# Patient Record
Sex: MALE | Race: BLACK OR AFRICAN AMERICAN | NOT HISPANIC OR LATINO | URBAN - METROPOLITAN AREA
[De-identification: names, ages, dates, MRNs, and addresses within clinical notes are randomized per-mention and may not be internally consistent; named-entity substitution may affect disease eponyms.]

---

## 2018-12-31 ENCOUNTER — INPATIENT (INPATIENT)
Facility: HOSPITAL | Age: 33
LOS: 2 days | Discharge: HOME | DRG: 638 | End: 2019-01-03
Attending: INTERNAL MEDICINE | Admitting: INTERNAL MEDICINE
Payer: SELF-PAY

## 2018-12-31 VITALS
RESPIRATION RATE: 18 BRPM | SYSTOLIC BLOOD PRESSURE: 119 MMHG | HEART RATE: 84 BPM | WEIGHT: 216.93 LBS | DIASTOLIC BLOOD PRESSURE: 79 MMHG | TEMPERATURE: 98 F | OXYGEN SATURATION: 100 % | HEIGHT: 74 IN

## 2018-12-31 DIAGNOSIS — K21.9 GASTRO-ESOPHAGEAL REFLUX DISEASE WITHOUT ESOPHAGITIS: ICD-10-CM

## 2018-12-31 DIAGNOSIS — E13.10 OTHER SPECIFIED DIABETES MELLITUS WITH KETOACIDOSIS WITHOUT COMA: ICD-10-CM

## 2018-12-31 DIAGNOSIS — E87.1 HYPO-OSMOLALITY AND HYPONATREMIA: ICD-10-CM

## 2018-12-31 DIAGNOSIS — Z29.9 ENCOUNTER FOR PROPHYLACTIC MEASURES, UNSPECIFIED: ICD-10-CM

## 2018-12-31 DIAGNOSIS — J11.1 INFLUENZA DUE TO UNIDENTIFIED INFLUENZA VIRUS WITH OTHER RESPIRATORY MANIFESTATIONS: ICD-10-CM

## 2018-12-31 DIAGNOSIS — E78.5 HYPERLIPIDEMIA, UNSPECIFIED: ICD-10-CM

## 2018-12-31 LAB
ALBUMIN SERPL ELPH-MCNC: 4 G/DL — SIGNIFICANT CHANGE UP (ref 3.5–5)
ALP SERPL-CCNC: 105 U/L — SIGNIFICANT CHANGE UP (ref 40–120)
ALT FLD-CCNC: 44 U/L DA — SIGNIFICANT CHANGE UP (ref 10–60)
ANION GAP SERPL CALC-SCNC: 13 MMOL/L — SIGNIFICANT CHANGE UP (ref 5–17)
ANION GAP SERPL CALC-SCNC: 16 MMOL/L — SIGNIFICANT CHANGE UP (ref 5–17)
ANION GAP SERPL CALC-SCNC: 19 MMOL/L — HIGH (ref 5–17)
ANION GAP SERPL CALC-SCNC: 20 MMOL/L — HIGH (ref 5–17)
APPEARANCE UR: CLEAR — SIGNIFICANT CHANGE UP
AST SERPL-CCNC: 39 U/L — SIGNIFICANT CHANGE UP (ref 10–40)
B-OH-BUTYR SERPL-SCNC: 6.1 MMOL/L — HIGH
BASE EXCESS BLDA CALC-SCNC: -12.2 MMOL/L — LOW (ref -2–2)
BASOPHILS # BLD AUTO: 0.1 K/UL — SIGNIFICANT CHANGE UP (ref 0–0.2)
BASOPHILS NFR BLD AUTO: 0.6 % — SIGNIFICANT CHANGE UP (ref 0–2)
BILIRUB SERPL-MCNC: 0.8 MG/DL — SIGNIFICANT CHANGE UP (ref 0.2–1.2)
BILIRUB UR-MCNC: NEGATIVE — SIGNIFICANT CHANGE UP
BLOOD GAS COMMENTS ARTERIAL: SIGNIFICANT CHANGE UP
BUN SERPL-MCNC: 11 MG/DL — SIGNIFICANT CHANGE UP (ref 7–18)
BUN SERPL-MCNC: 13 MG/DL — SIGNIFICANT CHANGE UP (ref 7–18)
BUN SERPL-MCNC: 15 MG/DL — SIGNIFICANT CHANGE UP (ref 7–18)
BUN SERPL-MCNC: 9 MG/DL — SIGNIFICANT CHANGE UP (ref 7–18)
CALCIUM SERPL-MCNC: 8.2 MG/DL — LOW (ref 8.4–10.5)
CALCIUM SERPL-MCNC: 8.3 MG/DL — LOW (ref 8.4–10.5)
CALCIUM SERPL-MCNC: 8.5 MG/DL — SIGNIFICANT CHANGE UP (ref 8.4–10.5)
CALCIUM SERPL-MCNC: 9.2 MG/DL — SIGNIFICANT CHANGE UP (ref 8.4–10.5)
CHLORIDE SERPL-SCNC: 82 MMOL/L — LOW (ref 96–108)
CHLORIDE SERPL-SCNC: 89 MMOL/L — LOW (ref 96–108)
CHLORIDE SERPL-SCNC: 92 MMOL/L — LOW (ref 96–108)
CHLORIDE SERPL-SCNC: 93 MMOL/L — LOW (ref 96–108)
CHLORIDE UR-SCNC: 31 MMOL/L — LOW (ref 55–125)
CO2 SERPL-SCNC: 16 MMOL/L — LOW (ref 22–31)
CO2 SERPL-SCNC: 17 MMOL/L — LOW (ref 22–31)
CO2 SERPL-SCNC: 18 MMOL/L — LOW (ref 22–31)
CO2 SERPL-SCNC: 22 MMOL/L — SIGNIFICANT CHANGE UP (ref 22–31)
COLOR SPEC: YELLOW — SIGNIFICANT CHANGE UP
CREAT SERPL-MCNC: 0.84 MG/DL — SIGNIFICANT CHANGE UP (ref 0.5–1.3)
CREAT SERPL-MCNC: 0.88 MG/DL — SIGNIFICANT CHANGE UP (ref 0.5–1.3)
CREAT SERPL-MCNC: 0.97 MG/DL — SIGNIFICANT CHANGE UP (ref 0.5–1.3)
CREAT SERPL-MCNC: 1.03 MG/DL — SIGNIFICANT CHANGE UP (ref 0.5–1.3)
DIFF PNL FLD: NEGATIVE — SIGNIFICANT CHANGE UP
EOSINOPHIL # BLD AUTO: 0 K/UL — SIGNIFICANT CHANGE UP (ref 0–0.5)
EOSINOPHIL NFR BLD AUTO: 0 % — SIGNIFICANT CHANGE UP (ref 0–6)
GLUCOSE SERPL-MCNC: 177 MG/DL — HIGH (ref 70–99)
GLUCOSE SERPL-MCNC: 240 MG/DL — HIGH (ref 70–99)
GLUCOSE SERPL-MCNC: 265 MG/DL — HIGH (ref 70–99)
GLUCOSE SERPL-MCNC: 352 MG/DL — HIGH (ref 70–99)
GLUCOSE UR QL: 1000 MG/DL
HBA1C BLD-MCNC: 11.8 % — HIGH (ref 4–5.6)
HCO3 BLDA-SCNC: 12 MMOL/L — LOW (ref 23–27)
HCT VFR BLD CALC: 51.8 % — HIGH (ref 39–50)
HGB BLD-MCNC: 16.4 G/DL — SIGNIFICANT CHANGE UP (ref 13–17)
HOROWITZ INDEX BLDA+IHG-RTO: 21 — SIGNIFICANT CHANGE UP
KETONES UR-MCNC: ABNORMAL
LEUKOCYTE ESTERASE UR-ACNC: NEGATIVE — SIGNIFICANT CHANGE UP
LYMPHOCYTES # BLD AUTO: 0.5 K/UL — LOW (ref 1–3.3)
LYMPHOCYTES # BLD AUTO: 6.7 % — LOW (ref 13–44)
MAGNESIUM SERPL-MCNC: 2 MG/DL — SIGNIFICANT CHANGE UP (ref 1.6–2.6)
MCHC RBC-ENTMCNC: 27.9 PG — SIGNIFICANT CHANGE UP (ref 27–34)
MCHC RBC-ENTMCNC: 31.5 GM/DL — LOW (ref 32–36)
MCV RBC AUTO: 88.6 FL — SIGNIFICANT CHANGE UP (ref 80–100)
MONOCYTES # BLD AUTO: 0.9 K/UL — SIGNIFICANT CHANGE UP (ref 0–0.9)
MONOCYTES NFR BLD AUTO: 11.4 % — SIGNIFICANT CHANGE UP (ref 2–14)
NEUTROPHILS # BLD AUTO: 6.3 K/UL — SIGNIFICANT CHANGE UP (ref 1.8–7.4)
NEUTROPHILS NFR BLD AUTO: 81.1 % — HIGH (ref 43–77)
NITRITE UR-MCNC: NEGATIVE — SIGNIFICANT CHANGE UP
OSMOLALITY SERPL: 282 MOS/KG — SIGNIFICANT CHANGE UP (ref 275–300)
PCO2 BLDA: 23 MMHG — LOW (ref 32–46)
PCP SPEC-MCNC: SIGNIFICANT CHANGE UP
PH BLDA: 7.33 — LOW (ref 7.35–7.45)
PH UR: 5 — SIGNIFICANT CHANGE UP (ref 5–8)
PHOSPHATE SERPL-MCNC: 2.3 MG/DL — LOW (ref 2.5–4.5)
PLATELET # BLD AUTO: 341 K/UL — SIGNIFICANT CHANGE UP (ref 150–400)
PO2 BLDA: 116 MMHG — HIGH (ref 74–108)
POTASSIUM SERPL-MCNC: 3.9 MMOL/L — SIGNIFICANT CHANGE UP (ref 3.5–5.3)
POTASSIUM SERPL-MCNC: 4.1 MMOL/L — SIGNIFICANT CHANGE UP (ref 3.5–5.3)
POTASSIUM SERPL-MCNC: 4.4 MMOL/L — SIGNIFICANT CHANGE UP (ref 3.5–5.3)
POTASSIUM SERPL-MCNC: 4.9 MMOL/L — SIGNIFICANT CHANGE UP (ref 3.5–5.3)
POTASSIUM SERPL-SCNC: 3.9 MMOL/L — SIGNIFICANT CHANGE UP (ref 3.5–5.3)
POTASSIUM SERPL-SCNC: 4.1 MMOL/L — SIGNIFICANT CHANGE UP (ref 3.5–5.3)
POTASSIUM SERPL-SCNC: 4.4 MMOL/L — SIGNIFICANT CHANGE UP (ref 3.5–5.3)
POTASSIUM SERPL-SCNC: 4.9 MMOL/L — SIGNIFICANT CHANGE UP (ref 3.5–5.3)
POTASSIUM UR-SCNC: 18 MMOL/L — LOW (ref 25–125)
PROT ?TM UR-MCNC: 34 MG/DL — HIGH (ref 0–12)
PROT SERPL-MCNC: 9 G/DL — HIGH (ref 6–8.3)
PROT UR-MCNC: 15
RBC # BLD: 5.86 M/UL — HIGH (ref 4.2–5.8)
RBC # FLD: 11.7 % — SIGNIFICANT CHANGE UP (ref 10.3–14.5)
SAO2 % BLDA: 98 % — HIGH (ref 92–96)
SODIUM SERPL-SCNC: 120 MMOL/L — CRITICAL LOW (ref 135–145)
SODIUM SERPL-SCNC: 124 MMOL/L — LOW (ref 135–145)
SODIUM SERPL-SCNC: 126 MMOL/L — LOW (ref 135–145)
SODIUM SERPL-SCNC: 127 MMOL/L — LOW (ref 135–145)
SODIUM UR-SCNC: 59 MMOL/L — SIGNIFICANT CHANGE UP (ref 40–220)
SP GR SPEC: 1.02 — SIGNIFICANT CHANGE UP (ref 1.01–1.02)
URATE UR-MCNC: 8.4 MG/DL — SIGNIFICANT CHANGE UP
UROBILINOGEN FLD QL: NEGATIVE — SIGNIFICANT CHANGE UP
WBC # BLD: 7.8 K/UL — SIGNIFICANT CHANGE UP (ref 3.8–10.5)
WBC # FLD AUTO: 7.8 K/UL — SIGNIFICANT CHANGE UP (ref 3.8–10.5)

## 2018-12-31 PROCEDURE — 71045 X-RAY EXAM CHEST 1 VIEW: CPT | Mod: 26

## 2018-12-31 PROCEDURE — 99284 EMERGENCY DEPT VISIT MOD MDM: CPT

## 2018-12-31 RX ORDER — CHLORHEXIDINE GLUCONATE 213 G/1000ML
1 SOLUTION TOPICAL
Qty: 0 | Refills: 0 | Status: DISCONTINUED | OUTPATIENT
Start: 2018-12-31 | End: 2019-01-03

## 2018-12-31 RX ORDER — SODIUM CHLORIDE 9 MG/ML
1000 INJECTION, SOLUTION INTRAVENOUS
Qty: 0 | Refills: 0 | Status: DISCONTINUED | OUTPATIENT
Start: 2018-12-31 | End: 2018-12-31

## 2018-12-31 RX ORDER — DEXTROSE 50 % IN WATER 50 %
15 SYRINGE (ML) INTRAVENOUS ONCE
Qty: 0 | Refills: 0 | Status: DISCONTINUED | OUTPATIENT
Start: 2018-12-31 | End: 2018-12-31

## 2018-12-31 RX ORDER — ACETAMINOPHEN 500 MG
650 TABLET ORAL EVERY 6 HOURS
Qty: 0 | Refills: 0 | Status: DISCONTINUED | OUTPATIENT
Start: 2018-12-31 | End: 2019-01-03

## 2018-12-31 RX ORDER — INSULIN LISPRO 100/ML
VIAL (ML) SUBCUTANEOUS EVERY 6 HOURS
Qty: 0 | Refills: 0 | Status: DISCONTINUED | OUTPATIENT
Start: 2018-12-31 | End: 2018-12-31

## 2018-12-31 RX ORDER — DEXTROSE 50 % IN WATER 50 %
25 SYRINGE (ML) INTRAVENOUS ONCE
Qty: 0 | Refills: 0 | Status: DISCONTINUED | OUTPATIENT
Start: 2018-12-31 | End: 2018-12-31

## 2018-12-31 RX ORDER — SODIUM CHLORIDE 9 MG/ML
1000 INJECTION INTRAMUSCULAR; INTRAVENOUS; SUBCUTANEOUS ONCE
Qty: 0 | Refills: 0 | Status: COMPLETED | OUTPATIENT
Start: 2018-12-31 | End: 2018-12-31

## 2018-12-31 RX ORDER — INSULIN LISPRO 100/ML
VIAL (ML) SUBCUTANEOUS
Qty: 0 | Refills: 0 | Status: DISCONTINUED | OUTPATIENT
Start: 2018-12-31 | End: 2018-12-31

## 2018-12-31 RX ORDER — INSULIN HUMAN 100 [IU]/ML
10 INJECTION, SOLUTION SUBCUTANEOUS ONCE
Qty: 0 | Refills: 0 | Status: COMPLETED | OUTPATIENT
Start: 2018-12-31 | End: 2018-12-31

## 2018-12-31 RX ORDER — SODIUM,POTASSIUM PHOSPHATES 278-250MG
1 POWDER IN PACKET (EA) ORAL EVERY 6 HOURS
Qty: 0 | Refills: 0 | Status: COMPLETED | OUTPATIENT
Start: 2018-12-31 | End: 2019-01-01

## 2018-12-31 RX ORDER — BENZOCAINE AND MENTHOL 5; 1 G/100ML; G/100ML
1 LIQUID ORAL EVERY 4 HOURS
Qty: 0 | Refills: 0 | Status: DISCONTINUED | OUTPATIENT
Start: 2018-12-31 | End: 2019-01-03

## 2018-12-31 RX ORDER — INSULIN LISPRO 100/ML
VIAL (ML) SUBCUTANEOUS
Qty: 0 | Refills: 0 | Status: DISCONTINUED | OUTPATIENT
Start: 2018-12-31 | End: 2019-01-03

## 2018-12-31 RX ORDER — PANTOPRAZOLE SODIUM 20 MG/1
40 TABLET, DELAYED RELEASE ORAL
Qty: 0 | Refills: 0 | Status: DISCONTINUED | OUTPATIENT
Start: 2018-12-31 | End: 2019-01-01

## 2018-12-31 RX ORDER — INSULIN LISPRO 100/ML
4 VIAL (ML) SUBCUTANEOUS
Qty: 0 | Refills: 0 | Status: DISCONTINUED | OUTPATIENT
Start: 2018-12-31 | End: 2019-01-02

## 2018-12-31 RX ORDER — INSULIN HUMAN 100 [IU]/ML
1 INJECTION, SOLUTION SUBCUTANEOUS
Qty: 100 | Refills: 0 | Status: DISCONTINUED | OUTPATIENT
Start: 2018-12-31 | End: 2019-01-01

## 2018-12-31 RX ORDER — GLUCAGON INJECTION, SOLUTION 0.5 MG/.1ML
1 INJECTION, SOLUTION SUBCUTANEOUS ONCE
Qty: 0 | Refills: 0 | Status: DISCONTINUED | OUTPATIENT
Start: 2018-12-31 | End: 2018-12-31

## 2018-12-31 RX ORDER — INSULIN GLARGINE 100 [IU]/ML
20 INJECTION, SOLUTION SUBCUTANEOUS AT BEDTIME
Qty: 0 | Refills: 0 | Status: DISCONTINUED | OUTPATIENT
Start: 2018-12-31 | End: 2019-01-02

## 2018-12-31 RX ORDER — ENOXAPARIN SODIUM 100 MG/ML
40 INJECTION SUBCUTANEOUS DAILY
Qty: 0 | Refills: 0 | Status: DISCONTINUED | OUTPATIENT
Start: 2018-12-31 | End: 2019-01-03

## 2018-12-31 RX ORDER — FAMOTIDINE 10 MG/ML
20 INJECTION INTRAVENOUS ONCE
Qty: 0 | Refills: 0 | Status: COMPLETED | OUTPATIENT
Start: 2018-12-31 | End: 2018-12-31

## 2018-12-31 RX ORDER — DEXTROSE MONOHYDRATE, SODIUM CHLORIDE, AND POTASSIUM CHLORIDE 50; .745; 4.5 G/1000ML; G/1000ML; G/1000ML
1000 INJECTION, SOLUTION INTRAVENOUS
Qty: 0 | Refills: 0 | Status: DISCONTINUED | OUTPATIENT
Start: 2018-12-31 | End: 2019-01-01

## 2018-12-31 RX ORDER — FAMOTIDINE 10 MG/ML
20 INJECTION INTRAVENOUS ONCE
Qty: 0 | Refills: 0 | Status: DISCONTINUED | OUTPATIENT
Start: 2018-12-31 | End: 2018-12-31

## 2018-12-31 RX ORDER — IBUPROFEN 200 MG
400 TABLET ORAL ONCE
Qty: 0 | Refills: 0 | Status: COMPLETED | OUTPATIENT
Start: 2018-12-31 | End: 2018-12-31

## 2018-12-31 RX ORDER — DEXTROSE 50 % IN WATER 50 %
12.5 SYRINGE (ML) INTRAVENOUS ONCE
Qty: 0 | Refills: 0 | Status: DISCONTINUED | OUTPATIENT
Start: 2018-12-31 | End: 2018-12-31

## 2018-12-31 RX ADMIN — Medication 400 MILLIGRAM(S): at 21:03

## 2018-12-31 RX ADMIN — CHLORHEXIDINE GLUCONATE 1 APPLICATION(S): 213 SOLUTION TOPICAL at 17:35

## 2018-12-31 RX ADMIN — Medication 30 MILLILITER(S): at 07:02

## 2018-12-31 RX ADMIN — ENOXAPARIN SODIUM 40 MILLIGRAM(S): 100 INJECTION SUBCUTANEOUS at 17:35

## 2018-12-31 RX ADMIN — INSULIN GLARGINE 20 UNIT(S): 100 INJECTION, SOLUTION SUBCUTANEOUS at 23:23

## 2018-12-31 RX ADMIN — Medication 1 TABLET(S): at 21:03

## 2018-12-31 RX ADMIN — Medication 400 MILLIGRAM(S): at 21:55

## 2018-12-31 RX ADMIN — Medication 650 MILLIGRAM(S): at 18:48

## 2018-12-31 RX ADMIN — FAMOTIDINE 20 MILLIGRAM(S): 10 INJECTION INTRAVENOUS at 07:02

## 2018-12-31 RX ADMIN — SODIUM CHLORIDE 100 MILLILITER(S): 9 INJECTION, SOLUTION INTRAVENOUS at 15:04

## 2018-12-31 RX ADMIN — INSULIN HUMAN 1 UNIT(S)/HR: 100 INJECTION, SOLUTION SUBCUTANEOUS at 17:36

## 2018-12-31 RX ADMIN — Medication 75 MILLIGRAM(S): at 17:39

## 2018-12-31 RX ADMIN — Medication 650 MILLIGRAM(S): at 15:47

## 2018-12-31 RX ADMIN — SODIUM CHLORIDE 1000 MILLILITER(S): 9 INJECTION INTRAMUSCULAR; INTRAVENOUS; SUBCUTANEOUS at 06:58

## 2018-12-31 RX ADMIN — INSULIN HUMAN 10 UNIT(S): 100 INJECTION, SOLUTION SUBCUTANEOUS at 09:28

## 2018-12-31 RX ADMIN — BENZOCAINE AND MENTHOL 1 LOZENGE: 5; 1 LIQUID ORAL at 18:49

## 2018-12-31 RX ADMIN — SODIUM CHLORIDE 1000 MILLILITER(S): 9 INJECTION INTRAMUSCULAR; INTRAVENOUS; SUBCUTANEOUS at 09:16

## 2018-12-31 NOTE — ED ADULT NURSE NOTE - OBJECTIVE STATEMENT
pt stated he was seen at an urgent care center 2 days ago  and was diagnosis with the flu, had a sore throat then and now the sore throat is getting worse and he has acid reflux. Stated he is currently taking Tamiflu and he feels weak.

## 2018-12-31 NOTE — H&P ADULT - ASSESSMENT
34 y/o M from home with PMH of DM (not on any meds currently, was on Insulin and metformin  in past), GERD came with c/o sore throat, productive cough, generalized weakness and malaise for 4 days. Labs significant for hyponatremia of 120. Serum glucose 352. AG of 20. ABG on room air: 7.33/23/116. Admitted to ICU for hyponatremia and DKA.

## 2018-12-31 NOTE — H&P ADULT - PROBLEM SELECTOR PLAN 6
IMPROVE VTE Individual Risk Assessment          RISK                                                          Points  [  ] Previous VTE                                                3  [  ] Thrombophilia                                             2  [  ] Lower limb paralysis                                   2        (unable to hold up >15 seconds)    [  ] Current Cancer                                             2         (within 6 months)  [ x ] Immobilization > 24 hrs                              1  [ x ] ICU/CCU stay > 24 hours                             1  [  ] Age > 60                                                         1    IMPROVE VTE Score: 2  Lovenox for DVT ppx  Protonix for GI ppx.

## 2018-12-31 NOTE — ED PROVIDER NOTE - PROGRESS NOTE DETAILS
labs reveal borderline DKA. pending serum ketones, ICU consult, admit. patient reports having Diabetes Mellitus in past then was able to stop medication ( metformin) with diet control etc. glucose has been pretty good.

## 2018-12-31 NOTE — ED PROVIDER NOTE - OBJECTIVE STATEMENT
patient with 4 days of sore throat body aches malaise, threw up once. also notes exacerbation of his gerd. son was sick 2 weeks

## 2018-12-31 NOTE — H&P ADULT - PROBLEM SELECTOR PLAN 5
IMPROVE VTE Individual Risk Assessment          RISK                                                          Points  [  ] Previous VTE                                                3  [  ] Thrombophilia                                             2  [  ] Lower limb paralysis                                   2        (unable to hold up >15 seconds)    [  ] Current Cancer                                             2         (within 6 months)  [ x ] Immobilization > 24 hrs                              1  [ x ] ICU/CCU stay > 24 hours                             1  [  ] Age > 60                                                         1    IMPROVE VTE Score: 2  Lovenox for DVT ppx  Protonix for GI ppx. Will initiate statin therapy once tolerating oral diet.

## 2018-12-31 NOTE — H&P ADULT - PROBLEM SELECTOR PLAN 2
- Serum glucose 352 with AG of 20 and small serum acetone, ketonuria and glycosuria on admission  - pH of 7.33 on ABG on room air.   - Will start on insulin drip  - accu cheks q1h  - Monitor BMP and VBG q6   - F/u serum beta hydroxy butyrate.   - f/u HbA1c

## 2018-12-31 NOTE — PATIENT PROFILE ADULT - BRADEN SCORE
Alert-The patient is alert, awake and responds to voice. The patient is oriented to time, place, and person. The triage nurse is able to obtain subjective information. 21

## 2018-12-31 NOTE — H&P ADULT - HISTORY OF PRESENT ILLNESS
34 y/o M from home with PMH of DM (not on any meds currently, was on Insulin and metformin  in past), GERD came with c/o sore throat, productive cough, generalized weakness and malaise for 4 days. Reported kids were sick at home last week, went to urgent care 2 days ago and was given Tamiflu which he took for 2 days. Patient states that he is not able to eat for past 2 days , vomited x 1 yesterday and have been drinking a lot of water to keep himself hydrated. Denies any  chest pain, dyspnea, abdominal pain, nausea, vomiting, urinary complaints, dizziness, headache or any other complaints. Uses cannabis frequently. Denies tobacco or alcohol use. 32 y/o M from home with PMH of DM (not on any meds currently, was on Insulin and metformin  in past), GERD came with c/o sore throat, productive cough, generalized weakness and malaise for 4 days. Reported kids were sick at home last week, went to urgent care 2 days ago and was given Tamiflu which he took for 2 days. Patient states that he is not able to eat for past 2 days , vomited x 1 yesterday and have been drinking a lot of water to keep himself hydrated. Denies any  chest pain, dyspnea, abdominal pain, nausea, vomiting, urinary complaints, dizziness, headache or any other complaints. Uses cannabis frequently. Denies tobacco or alcohol use.      ED course:  Vitals stable. Labs significant for hyponatremia of 120. Serum glucose 352. AG of 20. Small serum acetone.  ABG on room air: 7.33/23/116. Received 2 L NS bolus. ICU consulted for hyponatremia and concern for DKA.

## 2018-12-31 NOTE — H&P ADULT - PROBLEM SELECTOR PLAN 1
- Likely secondary to hypovolemic hyponatremia or hypertonic hyponatremia secondary to hyperglycemia.   - Na on admission 120, corrected sodium for serum glucose of 352 would be 124.  - s/p 2 L NS bolus, repeat serum sodium is 124 and corrected 127.   - Will start on 1/2 NS at 75 cc/hr.  - Check urine osmolality and urine lytes.   - Check serum cortisol, TSH and Lipid panel  - Monitor BMP q4-q6h  - Goal rate of sodium correction in first 24 hrs is 8 mEq/L. - Likely secondary to hypovolemic hyponatremia or hypertonic hyponatremia secondary to hyperglycemia.   - Na on admission 120, corrected sodium for serum glucose of 352 would be 124.  - s/p 2 L NS bolus, repeat serum sodium is 124 and corrected 127.   - Will start on 1/2 NS at 100 cc/hr.  - Check urine osmolality and urine lytes.   - Check serum cortisol, TSH and Lipid panel  - Monitor BMP q4-q6h  - Goal rate of sodium correction in first 24 hrs is 8 mEq/L.

## 2018-12-31 NOTE — H&P ADULT - PROBLEM SELECTOR PLAN 3
- patient has sore throat for 4 days, was found to have flu at urgent care 2 days back and took 2 days of Tamiflu.   - Continue Tamiflu for 3 more days  - Cepacol for sore throat  - supportive care.

## 2018-12-31 NOTE — H&P ADULT - ATTENDING COMMENTS
Patient seen and examined with resident, Addendum to above.    34 y/o male with history of DM but not on medications, recently diagnosed with flu as outpatient and on Tamiflu. Admitted to the ICU for DKA requiring insulin infusion. Will give IV hydration and insulin infusion. monitor serial BMPs. Also found to have hyponatremia with a corrected sodium of 124 on admission. Likely hypovolemic hyponatremia given patient with hypochloremia and stating he hasn't be tolerating oral diet due to being sick. Will aim to correct sodium level about 8 Meq over the next 24 hours. Will place patient on droplet precautions, given recent diagnosis of flu and being treated as outpatient.

## 2019-01-01 LAB
ALBUMIN SERPL ELPH-MCNC: 2.8 G/DL — LOW (ref 3.5–5)
ALP SERPL-CCNC: 79 U/L — SIGNIFICANT CHANGE UP (ref 40–120)
ALT FLD-CCNC: 39 U/L DA — SIGNIFICANT CHANGE UP (ref 10–60)
ANION GAP SERPL CALC-SCNC: 14 MMOL/L — SIGNIFICANT CHANGE UP (ref 5–17)
ANION GAP SERPL CALC-SCNC: 14 MMOL/L — SIGNIFICANT CHANGE UP (ref 5–17)
ANION GAP SERPL CALC-SCNC: 15 MMOL/L — SIGNIFICANT CHANGE UP (ref 5–17)
AST SERPL-CCNC: 50 U/L — HIGH (ref 10–40)
BASOPHILS # BLD AUTO: 0.2 K/UL — SIGNIFICANT CHANGE UP (ref 0–0.2)
BASOPHILS NFR BLD AUTO: 2 % — SIGNIFICANT CHANGE UP (ref 0–2)
BILIRUB SERPL-MCNC: 0.7 MG/DL — SIGNIFICANT CHANGE UP (ref 0.2–1.2)
BUN SERPL-MCNC: 11 MG/DL — SIGNIFICANT CHANGE UP (ref 7–18)
BUN SERPL-MCNC: 5 MG/DL — LOW (ref 7–18)
BUN SERPL-MCNC: 7 MG/DL — SIGNIFICANT CHANGE UP (ref 7–18)
CALCIUM SERPL-MCNC: 7.6 MG/DL — LOW (ref 8.4–10.5)
CALCIUM SERPL-MCNC: 8 MG/DL — LOW (ref 8.4–10.5)
CALCIUM SERPL-MCNC: 8.2 MG/DL — LOW (ref 8.4–10.5)
CHLORIDE SERPL-SCNC: 93 MMOL/L — LOW (ref 96–108)
CHLORIDE SERPL-SCNC: 95 MMOL/L — LOW (ref 96–108)
CHLORIDE SERPL-SCNC: 96 MMOL/L — SIGNIFICANT CHANGE UP (ref 96–108)
CK MB BLD-MCNC: 0.7 % — SIGNIFICANT CHANGE UP (ref 0–3.5)
CK MB CFR SERPL CALC: 1.5 NG/ML — SIGNIFICANT CHANGE UP (ref 0–3.6)
CK SERPL-CCNC: 210 U/L — SIGNIFICANT CHANGE UP (ref 35–232)
CO2 SERPL-SCNC: 17 MMOL/L — LOW (ref 22–31)
CO2 SERPL-SCNC: 21 MMOL/L — LOW (ref 22–31)
CO2 SERPL-SCNC: 22 MMOL/L — SIGNIFICANT CHANGE UP (ref 22–31)
CREAT SERPL-MCNC: 0.65 MG/DL — SIGNIFICANT CHANGE UP (ref 0.5–1.3)
CREAT SERPL-MCNC: 0.88 MG/DL — SIGNIFICANT CHANGE UP (ref 0.5–1.3)
CREAT SERPL-MCNC: 0.97 MG/DL — SIGNIFICANT CHANGE UP (ref 0.5–1.3)
CULTURE RESULTS: SIGNIFICANT CHANGE UP
EOSINOPHIL # BLD AUTO: 0 K/UL — SIGNIFICANT CHANGE UP (ref 0–0.5)
EOSINOPHIL NFR BLD AUTO: 0 % — SIGNIFICANT CHANGE UP (ref 0–6)
GLUCOSE SERPL-MCNC: 183 MG/DL — HIGH (ref 70–99)
GLUCOSE SERPL-MCNC: 198 MG/DL — HIGH (ref 70–99)
GLUCOSE SERPL-MCNC: 226 MG/DL — HIGH (ref 70–99)
HCT VFR BLD CALC: 45.7 % — SIGNIFICANT CHANGE UP (ref 39–50)
HGB BLD-MCNC: 14.6 G/DL — SIGNIFICANT CHANGE UP (ref 13–17)
LYMPHOCYTES # BLD AUTO: 2 K/UL — SIGNIFICANT CHANGE UP (ref 1–3.3)
LYMPHOCYTES # BLD AUTO: 22.4 % — SIGNIFICANT CHANGE UP (ref 13–44)
MAGNESIUM SERPL-MCNC: 1.9 MG/DL — SIGNIFICANT CHANGE UP (ref 1.6–2.6)
MCHC RBC-ENTMCNC: 27.9 PG — SIGNIFICANT CHANGE UP (ref 27–34)
MCHC RBC-ENTMCNC: 31.8 GM/DL — LOW (ref 32–36)
MCV RBC AUTO: 87.5 FL — SIGNIFICANT CHANGE UP (ref 80–100)
MONOCYTES # BLD AUTO: 1.5 K/UL — HIGH (ref 0–0.9)
MONOCYTES NFR BLD AUTO: 17 % — HIGH (ref 2–14)
NEUTROPHILS # BLD AUTO: 5.3 K/UL — SIGNIFICANT CHANGE UP (ref 1.8–7.4)
NEUTROPHILS NFR BLD AUTO: 58.6 % — SIGNIFICANT CHANGE UP (ref 43–77)
PHOSPHATE SERPL-MCNC: 2.7 MG/DL — SIGNIFICANT CHANGE UP (ref 2.5–4.5)
PLATELET # BLD AUTO: 306 K/UL — SIGNIFICANT CHANGE UP (ref 150–400)
POTASSIUM SERPL-MCNC: 3.4 MMOL/L — LOW (ref 3.5–5.3)
POTASSIUM SERPL-MCNC: 3.8 MMOL/L — SIGNIFICANT CHANGE UP (ref 3.5–5.3)
POTASSIUM SERPL-MCNC: 4.3 MMOL/L — SIGNIFICANT CHANGE UP (ref 3.5–5.3)
POTASSIUM SERPL-SCNC: 3.4 MMOL/L — LOW (ref 3.5–5.3)
POTASSIUM SERPL-SCNC: 3.8 MMOL/L — SIGNIFICANT CHANGE UP (ref 3.5–5.3)
POTASSIUM SERPL-SCNC: 4.3 MMOL/L — SIGNIFICANT CHANGE UP (ref 3.5–5.3)
PROT SERPL-MCNC: 7.2 G/DL — SIGNIFICANT CHANGE UP (ref 6–8.3)
RBC # BLD: 5.23 M/UL — SIGNIFICANT CHANGE UP (ref 4.2–5.8)
RBC # FLD: 11.7 % — SIGNIFICANT CHANGE UP (ref 10.3–14.5)
SODIUM SERPL-SCNC: 127 MMOL/L — LOW (ref 135–145)
SODIUM SERPL-SCNC: 130 MMOL/L — LOW (ref 135–145)
SODIUM SERPL-SCNC: 130 MMOL/L — LOW (ref 135–145)
SPECIMEN SOURCE: SIGNIFICANT CHANGE UP
TROPONIN I SERPL-MCNC: <0.015 NG/ML — SIGNIFICANT CHANGE UP (ref 0–0.04)
WBC # BLD: 9 K/UL — SIGNIFICANT CHANGE UP (ref 3.8–10.5)
WBC # FLD AUTO: 9 K/UL — SIGNIFICANT CHANGE UP (ref 3.8–10.5)

## 2019-01-01 RX ORDER — SODIUM CHLORIDE 9 MG/ML
1000 INJECTION INTRAMUSCULAR; INTRAVENOUS; SUBCUTANEOUS
Qty: 0 | Refills: 0 | Status: DISCONTINUED | OUTPATIENT
Start: 2019-01-01 | End: 2019-01-01

## 2019-01-01 RX ORDER — SODIUM CHLORIDE 9 MG/ML
1000 INJECTION INTRAMUSCULAR; INTRAVENOUS; SUBCUTANEOUS ONCE
Qty: 0 | Refills: 0 | Status: COMPLETED | OUTPATIENT
Start: 2019-01-01 | End: 2019-01-01

## 2019-01-01 RX ORDER — ATORVASTATIN CALCIUM 80 MG/1
40 TABLET, FILM COATED ORAL AT BEDTIME
Qty: 0 | Refills: 0 | Status: DISCONTINUED | OUTPATIENT
Start: 2019-01-01 | End: 2019-01-03

## 2019-01-01 RX ORDER — INSULIN LISPRO 100/ML
4 VIAL (ML) SUBCUTANEOUS ONCE
Qty: 0 | Refills: 0 | Status: COMPLETED | OUTPATIENT
Start: 2019-01-01 | End: 2019-01-01

## 2019-01-01 RX ORDER — POTASSIUM CHLORIDE 20 MEQ
20 PACKET (EA) ORAL
Qty: 0 | Refills: 0 | Status: COMPLETED | OUTPATIENT
Start: 2019-01-01 | End: 2019-01-01

## 2019-01-01 RX ORDER — PANTOPRAZOLE SODIUM 20 MG/1
40 TABLET, DELAYED RELEASE ORAL
Qty: 0 | Refills: 0 | Status: DISCONTINUED | OUTPATIENT
Start: 2019-01-01 | End: 2019-01-03

## 2019-01-01 RX ADMIN — Medication 650 MILLIGRAM(S): at 16:42

## 2019-01-01 RX ADMIN — Medication 75 MILLIGRAM(S): at 17:00

## 2019-01-01 RX ADMIN — Medication 8: at 06:23

## 2019-01-01 RX ADMIN — Medication 1 TABLET(S): at 05:55

## 2019-01-01 RX ADMIN — Medication 6: at 15:59

## 2019-01-01 RX ADMIN — Medication 1 TABLET(S): at 11:02

## 2019-01-01 RX ADMIN — DEXTROSE MONOHYDRATE, SODIUM CHLORIDE, AND POTASSIUM CHLORIDE 100 MILLILITER(S): 50; .745; 4.5 INJECTION, SOLUTION INTRAVENOUS at 00:07

## 2019-01-01 RX ADMIN — Medication 1 TABLET(S): at 00:58

## 2019-01-01 RX ADMIN — SODIUM CHLORIDE 2000 MILLILITER(S): 9 INJECTION INTRAMUSCULAR; INTRAVENOUS; SUBCUTANEOUS at 04:42

## 2019-01-01 RX ADMIN — Medication 4 UNIT(S): at 02:37

## 2019-01-01 RX ADMIN — Medication 4 UNIT(S): at 11:03

## 2019-01-01 RX ADMIN — ENOXAPARIN SODIUM 40 MILLIGRAM(S): 100 INJECTION SUBCUTANEOUS at 11:01

## 2019-01-01 RX ADMIN — Medication 650 MILLIGRAM(S): at 17:00

## 2019-01-01 RX ADMIN — Medication 20 MILLIEQUIVALENT(S): at 16:42

## 2019-01-01 RX ADMIN — Medication 4: at 11:02

## 2019-01-01 RX ADMIN — Medication 4 UNIT(S): at 06:27

## 2019-01-01 RX ADMIN — SODIUM CHLORIDE 2000 MILLILITER(S): 9 INJECTION INTRAMUSCULAR; INTRAVENOUS; SUBCUTANEOUS at 03:15

## 2019-01-01 RX ADMIN — INSULIN GLARGINE 20 UNIT(S): 100 INJECTION, SOLUTION SUBCUTANEOUS at 21:16

## 2019-01-01 RX ADMIN — Medication 20 MILLIEQUIVALENT(S): at 18:01

## 2019-01-01 RX ADMIN — PANTOPRAZOLE SODIUM 40 MILLIGRAM(S): 20 TABLET, DELAYED RELEASE ORAL at 06:27

## 2019-01-01 RX ADMIN — Medication 4 UNIT(S): at 15:59

## 2019-01-01 RX ADMIN — Medication 75 MILLIGRAM(S): at 05:55

## 2019-01-01 RX ADMIN — Medication 20 MILLIEQUIVALENT(S): at 14:55

## 2019-01-01 RX ADMIN — Medication 650 MILLIGRAM(S): at 10:08

## 2019-01-01 RX ADMIN — CHLORHEXIDINE GLUCONATE 1 APPLICATION(S): 213 SOLUTION TOPICAL at 17:00

## 2019-01-01 RX ADMIN — Medication 650 MILLIGRAM(S): at 10:25

## 2019-01-01 RX ADMIN — CHLORHEXIDINE GLUCONATE 1 APPLICATION(S): 213 SOLUTION TOPICAL at 05:52

## 2019-01-01 NOTE — CHART NOTE - NSCHARTNOTEFT_GEN_A_CORE
ICU DOWNGRADE NOTE:    34 y/o M from home with PMH of DM (not on any meds currently, was on Insulin and metformin  in past), GERD came with c/o sore throat, productive cough, generalized weakness and malaise for 4 days. Labs significant for hyponatremia of 120. Serum glucose 352. AG of 20. ABG on room air: 7.33/23/116. Admitted to ICU for hyponatremia and DKA, both improved. Gap closed after insulin drip initiated. Patient's hyponatremia improved to Na of 130     Problem/Plan - 1:  ·  Problem: Hyponatremia.  Plan: - Likely secondary to hypertonic hyponatremia secondary to hyperglycemia.   - Na on admission 120, most recent Na is 130, c/w NS @125ml/hr   - TSH wnl, lipid profile mildly elevated triglycerides, will need repeat lipid profile in 5-6 weeks post discharge  - Monitor BMP q6h.      Problem/Plan - 2:  ·  Problem: DKA (diabetic ketoacidoses).  Plan: - Serum glucose 352 with AG of 20 and small serum acetone, ketonuria and glycosuria on admission  - pH of 7.33 on ABG on room air.   - anion gap closed s/p insulin drip, bridged to lantus 20 units at bedtime and humalog 4 units TID along with moderate sliding scale  -HbA1c 11.8, requiring insulin  -patient does not want to use metformin due to GI side effects     Problem/Plan - 3:  ·  Problem: Flu.  Plan: - patient has sore throat for 4 days, was found to have flu at urgent care 2 days back and took 2 days of Tamiflu.   - Continue Tamiflu day 3/5 - last dose 01/02  - Cepacol for sore throat  - supportive care.   - 'syncopal' episode overnight, likely 2/2 orthostatic hypotension, cardiac enzymes negative, echocardiogram within normal limits     Problem/Plan - 4:  ·  Problem: GERD (gastroesophageal reflux disease).  Plan: - started on Protonix.      Problem/Plan - 5:  ·  Problem: HLD (hyperlipidemia).  Plan: lipitor started.    GI PPx - protonix  DVT PPx - lovenox    Patient is stable for downgrade to general medical floor with droplet isolation precautions to be continued through 01/02. Discussed above with ICU attending on rounds. ICU DOWNGRADE NOTE:    32 y/o M from home with PMH of DM (not on any meds currently, was on Insulin and metformin  in past), GERD came with c/o sore throat, productive cough, generalized weakness and malaise for 4 days. Labs significant for hyponatremia of 120. Serum glucose 352. AG of 20. ABG on room air: 7.33/23/116. Admitted to ICU for hyponatremia and DKA, both improved. Gap closed after insulin drip initiated. Patient's hyponatremia improved to Na of 130     Problem/Plan - 1:  ·  Problem: Hyponatremia.  Plan: - Likely secondary to hypertonic hyponatremia secondary to hyperglycemia.   - Na on admission 120, most recent Na is 130, c/w NS @125ml/hr   - TSH wnl, lipid profile mildly elevated triglycerides, will need repeat lipid profile in 5-6 weeks post discharge  - Monitor BMP q6h.      Problem/Plan - 2:  ·  Problem: DKA (diabetic ketoacidoses).  Plan: - Serum glucose 352 with AG of 20 and small serum acetone, ketonuria and glycosuria on admission  - pH of 7.33 on ABG on room air.   - anion gap closed s/p insulin drip, bridged to lantus 20 units at bedtime and humalog 4 units TID along with moderate sliding scale  -HbA1c 11.8, requiring insulin  -patient does not want to use metformin due to GI side effects     Problem/Plan - 3:  ·  Problem: Flu.  Plan: - patient has sore throat for 4 days, was found to have flu at urgent care 2 days back and took 2 days of Tamiflu.   - Continue Tamiflu day 3/5 - last dose 01/02  - Cepacol for sore throat  - supportive care.   - 'syncopal' episode overnight, likely 2/2 orthostatic hypotension, cardiac enzymes negative, echocardiogram within normal limits     Problem/Plan - 4:  ·  Problem: GERD (gastroesophageal reflux disease).  Plan: - started on Protonix.      Problem/Plan - 5:  ·  Problem: HLD (hyperlipidemia).  Plan: lipitor started.    GI PPx - protonix  DVT PPx - lovenox    Patient is stable for downgrade to general medical floor with droplet isolation precautions to be continued through 01/02. Discussed above with ICU attending on rounds.      ..

## 2019-01-01 NOTE — CHART NOTE - NSCHARTNOTEFT_GEN_A_CORE
Patient felt hot and uncomfortable in the bed and was trying to get out of the bed, and he fell down on the floor. He denies hitting his head. On my examination, patient was on the floor, awake, sweating profusely. SBP was in 70-80s. He tried getting up and synopsized twice, with SBP falling to 40s in sitting position. Blood glucose was 218. Received 2 L of NS while on the floor. SBP improved to 110 after 2 bolus. IV NS 125cc/ hour started. ECHo and carotid doppler ordered.

## 2019-01-02 PROBLEM — Z00.00 ENCOUNTER FOR PREVENTIVE HEALTH EXAMINATION: Status: ACTIVE | Noted: 2019-01-02

## 2019-01-02 LAB
ANION GAP SERPL CALC-SCNC: 13 MMOL/L — SIGNIFICANT CHANGE UP (ref 5–17)
ANION GAP SERPL CALC-SCNC: 8 MMOL/L — SIGNIFICANT CHANGE UP (ref 5–17)
BUN SERPL-MCNC: 7 MG/DL — SIGNIFICANT CHANGE UP (ref 7–18)
BUN SERPL-MCNC: 7 MG/DL — SIGNIFICANT CHANGE UP (ref 7–18)
CALCIUM SERPL-MCNC: 8.3 MG/DL — LOW (ref 8.4–10.5)
CALCIUM SERPL-MCNC: 8.6 MG/DL — SIGNIFICANT CHANGE UP (ref 8.4–10.5)
CHLORIDE SERPL-SCNC: 90 MMOL/L — LOW (ref 96–108)
CHLORIDE SERPL-SCNC: 94 MMOL/L — LOW (ref 96–108)
CO2 SERPL-SCNC: 26 MMOL/L — SIGNIFICANT CHANGE UP (ref 22–31)
CO2 SERPL-SCNC: 29 MMOL/L — SIGNIFICANT CHANGE UP (ref 22–31)
CREAT SERPL-MCNC: 0.7 MG/DL — SIGNIFICANT CHANGE UP (ref 0.5–1.3)
CREAT SERPL-MCNC: 0.83 MG/DL — SIGNIFICANT CHANGE UP (ref 0.5–1.3)
GLUCOSE BLDC GLUCOMTR-MCNC: 180 MG/DL — HIGH (ref 70–99)
GLUCOSE BLDC GLUCOMTR-MCNC: 232 MG/DL — HIGH (ref 70–99)
GLUCOSE BLDC GLUCOMTR-MCNC: 253 MG/DL — HIGH (ref 70–99)
GLUCOSE BLDC GLUCOMTR-MCNC: 274 MG/DL — HIGH (ref 70–99)
GLUCOSE BLDC GLUCOMTR-MCNC: 278 MG/DL — HIGH (ref 70–99)
GLUCOSE BLDC GLUCOMTR-MCNC: 281 MG/DL — HIGH (ref 70–99)
GLUCOSE SERPL-MCNC: 179 MG/DL — HIGH (ref 70–99)
GLUCOSE SERPL-MCNC: 241 MG/DL — HIGH (ref 70–99)
HCT VFR BLD CALC: 43.4 % — SIGNIFICANT CHANGE UP (ref 39–50)
HGB BLD-MCNC: 14 G/DL — SIGNIFICANT CHANGE UP (ref 13–17)
MAGNESIUM SERPL-MCNC: 2 MG/DL — SIGNIFICANT CHANGE UP (ref 1.6–2.6)
MCHC RBC-ENTMCNC: 27.9 PG — SIGNIFICANT CHANGE UP (ref 27–34)
MCHC RBC-ENTMCNC: 32.2 GM/DL — SIGNIFICANT CHANGE UP (ref 32–36)
MCV RBC AUTO: 86.6 FL — SIGNIFICANT CHANGE UP (ref 80–100)
PHOSPHATE SERPL-MCNC: 3.2 MG/DL — SIGNIFICANT CHANGE UP (ref 2.5–4.5)
PLATELET # BLD AUTO: 294 K/UL — SIGNIFICANT CHANGE UP (ref 150–400)
POTASSIUM SERPL-MCNC: 3.4 MMOL/L — LOW (ref 3.5–5.3)
POTASSIUM SERPL-MCNC: 3.7 MMOL/L — SIGNIFICANT CHANGE UP (ref 3.5–5.3)
POTASSIUM SERPL-SCNC: 3.4 MMOL/L — LOW (ref 3.5–5.3)
POTASSIUM SERPL-SCNC: 3.7 MMOL/L — SIGNIFICANT CHANGE UP (ref 3.5–5.3)
RBC # BLD: 5.01 M/UL — SIGNIFICANT CHANGE UP (ref 4.2–5.8)
RBC # FLD: 11.7 % — SIGNIFICANT CHANGE UP (ref 10.3–14.5)
SODIUM SERPL-SCNC: 129 MMOL/L — LOW (ref 135–145)
SODIUM SERPL-SCNC: 131 MMOL/L — LOW (ref 135–145)
WBC # BLD: 7.7 K/UL — SIGNIFICANT CHANGE UP (ref 3.8–10.5)
WBC # FLD AUTO: 7.7 K/UL — SIGNIFICANT CHANGE UP (ref 3.8–10.5)

## 2019-01-02 PROCEDURE — 93880 EXTRACRANIAL BILAT STUDY: CPT | Mod: 26

## 2019-01-02 RX ORDER — SODIUM CHLORIDE 9 MG/ML
1000 INJECTION INTRAMUSCULAR; INTRAVENOUS; SUBCUTANEOUS
Qty: 0 | Refills: 0 | Status: DISCONTINUED | OUTPATIENT
Start: 2019-01-02 | End: 2019-01-03

## 2019-01-02 RX ORDER — POTASSIUM CHLORIDE 20 MEQ
40 PACKET (EA) ORAL EVERY 4 HOURS
Qty: 0 | Refills: 0 | Status: COMPLETED | OUTPATIENT
Start: 2019-01-02 | End: 2019-01-03

## 2019-01-02 RX ORDER — SODIUM CHLORIDE 9 MG/ML
1000 INJECTION INTRAMUSCULAR; INTRAVENOUS; SUBCUTANEOUS
Qty: 0 | Refills: 0 | Status: DISCONTINUED | OUTPATIENT
Start: 2019-01-02 | End: 2019-01-02

## 2019-01-02 RX ORDER — POTASSIUM CHLORIDE 20 MEQ
40 PACKET (EA) ORAL EVERY 4 HOURS
Qty: 0 | Refills: 0 | Status: COMPLETED | OUTPATIENT
Start: 2019-01-02 | End: 2019-01-02

## 2019-01-02 RX ORDER — INSULIN LISPRO 100/ML
6 VIAL (ML) SUBCUTANEOUS
Qty: 0 | Refills: 0 | Status: DISCONTINUED | OUTPATIENT
Start: 2019-01-02 | End: 2019-01-03

## 2019-01-02 RX ORDER — INSULIN GLARGINE 100 [IU]/ML
25 INJECTION, SOLUTION SUBCUTANEOUS AT BEDTIME
Qty: 0 | Refills: 0 | Status: DISCONTINUED | OUTPATIENT
Start: 2019-01-02 | End: 2019-01-03

## 2019-01-02 RX ADMIN — ENOXAPARIN SODIUM 40 MILLIGRAM(S): 100 INJECTION SUBCUTANEOUS at 13:07

## 2019-01-02 RX ADMIN — Medication 6 UNIT(S): at 12:36

## 2019-01-02 RX ADMIN — Medication 6 UNIT(S): at 17:25

## 2019-01-02 RX ADMIN — Medication 40 MILLIEQUIVALENT(S): at 23:39

## 2019-01-02 RX ADMIN — SODIUM CHLORIDE 125 MILLILITER(S): 9 INJECTION INTRAMUSCULAR; INTRAVENOUS; SUBCUTANEOUS at 22:26

## 2019-01-02 RX ADMIN — Medication 4 UNIT(S): at 08:45

## 2019-01-02 RX ADMIN — SODIUM CHLORIDE 100 MILLILITER(S): 9 INJECTION INTRAMUSCULAR; INTRAVENOUS; SUBCUTANEOUS at 09:15

## 2019-01-02 RX ADMIN — Medication 6: at 12:35

## 2019-01-02 RX ADMIN — Medication 75 MILLIGRAM(S): at 17:25

## 2019-01-02 RX ADMIN — Medication 6: at 08:44

## 2019-01-02 RX ADMIN — Medication 4: at 17:24

## 2019-01-02 RX ADMIN — INSULIN GLARGINE 25 UNIT(S): 100 INJECTION, SOLUTION SUBCUTANEOUS at 22:25

## 2019-01-02 RX ADMIN — PANTOPRAZOLE SODIUM 40 MILLIGRAM(S): 20 TABLET, DELAYED RELEASE ORAL at 06:31

## 2019-01-02 RX ADMIN — Medication 75 MILLIGRAM(S): at 06:31

## 2019-01-02 NOTE — PROGRESS NOTE ADULT - ASSESSMENT
32 y/o M from home with PMH of DM (not on any meds currently, was on Insulin and metformin  in past), GERD came with c/o sore throat, productive cough, generalized weakness and malaise for 4 days. Labs significant for hyponatremia of 120. Serum glucose 352. AG of 20. ABG on room air: 7.33/23/116. Admitted to ICU for hyponatremia and DKA, both improved
34 y/o M from home with PMH of DM (not on any meds currently, was on Insulin and metformin  in past), GERD came with c/o sore throat, productive cough, generalized weakness and malaise for 4 days. Labs significant for hyponatremia of 120. Serum glucose 352. AG of 20. ABG on room air: 7.33/23/116.   Admitted to ICU for hyponatremia and DKA.  Downgraded to medicine on 1/2.

## 2019-01-02 NOTE — PROGRESS NOTE ADULT - SUBJECTIVE AND OBJECTIVE BOX
Hospitalist Progress Note    2017  Admit Date: 2017  5:07 PM   NAME: Becca Valladares   :  1947   MRN:  [id number]   Attending: Amanda Kelley DO  PCP:  None      Admitted for:osteomyelitis right lower extremity, s/p debridement and     SUBJECTIVE:   Patient this morning has no complaints no nausea no vomiting tolerating diet. No fever chills no shortness of breath. Pain in foot still present but relieved with pain medications. Hospital Course  Pt is a 70 yo male who presented with progressive pain and poor healing of right foot wound over 1 month, admitted with infected DM foot. PMH includes DM (A1C 9.2 2016), CHF, CKD, current smoker. X Ray of right foot had low suspicion for osteomyelitis but does reveal soft tissue lucencies, possible gas producing infection. Surgery and Vascular were consulted. He had bedside debridement on  and surgical I&D/5th toe amputation . He was started on Vanc/Zosyn in ER. ID has seen - changed atbx  to IV Rocephin/PO Flagyl. 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 Will need 6 weeks antbx per ID recs. Also seen by vascular surgery due to abnormal right MAGGIE and poor postoperative wound healing.     Review of Systems negative with exception of pertinent positives noted above  Past medical history unchanged from H&P    PHYSICAL EXAM     Visit Vitals    /73    Pulse 88    Temp 98.2 °F (36.8 °C)    Resp 15    Ht 6' 1\" (1.854 m)    Wt 115.9 kg (255 lb 8 oz)    SpO2 94%    BMI 33.71 kg/m2      Temp (24hrs), Av.4 °F (36.9 °C), Min:98 °F (36.7 °C), Max:99 °F (37.2 °C)    Oxygen Therapy  O2 Sat (%): 94 % (17 0715)  Pulse via Oximetry: 90 beats per minute (17 1450)  O2 Device: Nasal cannula (17 1440)  O2 Flow Rate (L/min): 2 l/min (17 1440)    Intake/Output Summary (Last 24 hours) at 17 1031  Last data filed at 17 2244   Gross per 24 hour   Intake              720 ml   Output              450
INTERVAL /OVERNIGHT EVENTS: patient had syncopal episode last night per ICU team, however patient denies syncopal episode, states he never lost consciousness, and he went to the floor and wanted to lay down due to feeling excessive heat    PRESSORS: [ ] YES [x ] NO    Antimicrobial:  oseltamivir 75 milliGRAM(s) Oral two times a day    Hematalogic:  enoxaparin Injectable 40 milliGRAM(s) SubCutaneous daily    Other:  acetaminophen   Tablet .. 650 milliGRAM(s) Oral every 6 hours PRN  benzocaine 15 mG/menthol 3.6 mG (Sugar-Free) Lozenge 1 Lozenge Oral every 4 hours PRN  chlorhexidine 4% Liquid 1 Application(s) Topical two times a day  insulin glargine Injectable (LANTUS) 20 Unit(s) SubCutaneous at bedtime  insulin lispro (HumaLOG) corrective regimen sliding scale   SubCutaneous three times a day before meals  insulin lispro Injectable (HumaLOG) 4 Unit(s) SubCutaneous three times a day before meals  pantoprazole   Suspension 40 milliGRAM(s) Oral before breakfast  potassium acid phosphate/sodium acid phosphate tablet (K-PHOS No. 2) 1 Tablet(s) Oral every 6 hours  sodium chloride 0.9%. 1000 milliLiter(s) IV Continuous <Continuous>    acetaminophen   Tablet .. 650 milliGRAM(s) Oral every 6 hours PRN  benzocaine 15 mG/menthol 3.6 mG (Sugar-Free) Lozenge 1 Lozenge Oral every 4 hours PRN  chlorhexidine 4% Liquid 1 Application(s) Topical two times a day  enoxaparin Injectable 40 milliGRAM(s) SubCutaneous daily  insulin glargine Injectable (LANTUS) 20 Unit(s) SubCutaneous at bedtime  insulin lispro (HumaLOG) corrective regimen sliding scale   SubCutaneous three times a day before meals  insulin lispro Injectable (HumaLOG) 4 Unit(s) SubCutaneous three times a day before meals  oseltamivir 75 milliGRAM(s) Oral two times a day  pantoprazole   Suspension 40 milliGRAM(s) Oral before breakfast  potassium acid phosphate/sodium acid phosphate tablet (K-PHOS No. 2) 1 Tablet(s) Oral every 6 hours  sodium chloride 0.9%. 1000 milliLiter(s) IV Continuous <Continuous>    Drug Dosing Weight  Height (cm): 187.96 (31 Dec 2018 05:45)  Weight (kg): 98.4 (31 Dec 2018 05:45)  BMI (kg/m2): 27.9 (31 Dec 2018 05:45)  BSA (m2): 2.25 (31 Dec 2018 05:45)    CENTRAL LINE: [ ] YES [ x] NO      UGALDE: [ ] YES [x] NO      A-LINE:  [ ] YES [x ] NO      PMH -reviewed admission note, no change since admission     ICU Vital Signs Last 24 Hrs  T(C): 36.6 (2019 05:00), Max: 36.8 (31 Dec 2018 10:43)  T(F): 97.9 (2019 05:00), Max: 98.2 (31 Dec 2018 10:43)  HR: 108 (2019 08:00) (84 - 111)  BP: 111/81 (2019 08:00) (73/55 - 140/89)  BP(mean): 89 (2019 08:00) (60 - 100)  RR: 18 (2019 08:00) (13 - 25)  SpO2: 98% (2019 08:00) (83% - 100%)      ABG - ( 31 Dec 2018 08:22 )  pH, Arterial: 7.33  pH, Blood: x     /  pCO2: 23    /  pO2: 116   / HCO3: 12    / Base Excess: -12.2 /  SaO2: 98           @ 07:01  -   @ 07:00  --------------------------------------------------------  IN: 3733 mL / OUT: 2600 mL / NET: 1133 mL      PHYSICAL EXAM:    GENERAL: NAD  HEAD: atraumatic, normocephalic   EYES: PERRLA, white sclera.   ENMT: oral cavity moist  NECK: supple, no JVD  LYMPH: no palpable lymph nodes     SKIN: warm, dry   CHEST/LUNG:  No Chest deformity , no chest tenderness. bilateral breath sounds, no  adventitious sounds  HEART: RRR, no m/r/g   ABDOMEN: soft, nontender, nondistended; bowel sounds.  : ugalde catheter.  EXTREMITIES: no edema, no cyanosis, no clubbing.  NEURO: AA0X3, no focal neuro deficits        LABS:  CBC Full  -  ( 2019 04:20 )  WBC Count : 9.0 K/uL  Hemoglobin : 14.6 g/dL  Hematocrit : 45.7 %  Platelet Count - Automated : 306 K/uL  Mean Cell Volume : 87.5 fl  Mean Cell Hemoglobin : 27.9 pg  Mean Cell Hemoglobin Concentration : 31.8 gm/dL  Auto Neutrophil # : 5.3 K/uL  Auto Lymphocyte # : 2.0 K/uL  Auto Monocyte # : 1.5 K/uL  Auto Eosinophil # : 0.0 K/uL  Auto Basophil # : 0.2 K/uL  Auto Neutrophil % : 58.6 %  Auto Lymphocyte % : 22.4 %  Auto Monocyte % : 17.0 %  Auto Eosinophil % : 0.0 %  Auto Basophil % : 2.0 %        127<L>  |  96  |  11  ----------------------------<  198<H>  4.3   |  17<L>  |  0.97    Ca    8.2<L>      2019 04:20  Phos  2.7       Mg     1.9         TPro  7.2  /  Alb  2.8<L>  /  TBili  0.7  /  DBili  x   /  AST  50<H>  /  ALT  39  /  AlkPhos  79        Urinalysis Basic - ( 31 Dec 2018 11:44 )    Color: Yellow / Appearance: Clear / S.025 / pH: x  Gluc: x / Ketone: Large  / Bili: Negative / Urobili: Negative   Blood: x / Protein: 15 / Nitrite: Negative   Leuk Esterase: Negative / RBC: 0-2 /HPF / WBC 0-2 /HPF   Sq Epi: x / Non Sq Epi: Occasional /HPF / Bacteria: x        CRITICAL CARE TIME SPENT: 35 minutes
ml   Net              270 ml        General: No acute distress  mucous membranes pink and moist acyanotic anicteric  Neck:  Supple full range of motion  Lungs:  Air entry equal bilaterally, no crepitations rales rhonchi   Heart:  Regular rate and rhythm,  No murmur, rub, or gallop  Abdomen: Soft, Non distended, Non tender, no rebound guarding Positive bowel sounds  Extremities: No cyanosis, clubbing or edema right lower extremity dressing clean and dry. Neurologic:  No focal deficits  Musculoskeletal: no   Joint swelling tenderness erythema  Skin:  No erythema, rashes noted          LAB  Recent Labs      01/23/17   0717  01/22/17   2130  01/22/17   1606  01/22/17   1118  01/22/17   0725   GLUCPOC  149*  254*  230*  167*  74      Recent Labs      01/23/17   0607   WBC  6.8   HGB  8.2*   HCT  25.9*   PLT  342     Recent Labs      01/23/17   0607  01/22/17   0546   NA   --   145   K   --   3.4*   CL   --   112*   CO2   --   24   GLU   --   78   BUN   --   16   CREA  2.14*  1.96*   CA   --   7.5*   ALB  1.2*   --    TBILI  0.2   --    ALT  12   --    SGOT  21   --        EKG and imaging reviewed personally by me  No results found. Results for orders placed or performed during the hospital encounter of 01/05/17   EKG, 12 LEAD, INITIAL   Result Value Ref Range    Systolic BP  mmHg    Diastolic BP  mmHg    Ventricular Rate 98 BPM    Atrial Rate 98 BPM    P-R Interval 166 ms    QRS Duration 74 ms    Q-T Interval 352 ms    QTC Calculation (Bezet) 449 ms    Calculated P Axis 62 degrees    Calculated R Axis 46 degrees    Calculated T Axis -74 degrees    Diagnosis       !! AGE AND GENDER SPECIFIC ECG ANALYSIS !!   Sinus rhythm with occasional Premature ventricular complexes  ST & T wave abnormality, consider inferolateral ischemia  Abnormal ECG  Confirmed by ST LYNNETTE JOSE MD (), JOSE BASHIR ([id number]) on 1/5/2017 7:42:32 PM       XR Results (most recent):    Results from [place] encounter on 01/05/17   XR CHEST PA LAT
· Reason for Admission	hyponatremia	      · Subjective and Objective: 	  INTERVAL /OVERNIGHT EVENTS: patient had syncopal episode last night per ICU team, however patient denies syncopal episode, states he never lost consciousness, and he went to the floor and wanted to lay down due to feeling excessive heat    PRESSORS: [ ] YES [x ] NO    Antimicrobial:  oseltamivir 75 milliGRAM(s) Oral two times a day    Hematalogic:  enoxaparin Injectable 40 milliGRAM(s) SubCutaneous daily    Other:  acetaminophen   Tablet .. 650 milliGRAM(s) Oral every 6 hours PRN  benzocaine 15 mG/menthol 3.6 mG (Sugar-Free) Lozenge 1 Lozenge Oral every 4 hours PRN  chlorhexidine 4% Liquid 1 Application(s) Topical two times a day  insulin glargine Injectable (LANTUS) 20 Unit(s) SubCutaneous at bedtime  insulin lispro (HumaLOG) corrective regimen sliding scale   SubCutaneous three times a day before meals  insulin lispro Injectable (HumaLOG) 4 Unit(s) SubCutaneous three times a day before meals  pantoprazole   Suspension 40 milliGRAM(s) Oral before breakfast  potassium acid phosphate/sodium acid phosphate tablet (K-PHOS No. 2) 1 Tablet(s) Oral every 6 hours  sodium chloride 0.9%. 1000 milliLiter(s) IV Continuous <Continuous>    acetaminophen   Tablet .. 650 milliGRAM(s) Oral every 6 hours PRN  benzocaine 15 mG/menthol 3.6 mG (Sugar-Free) Lozenge 1 Lozenge Oral every 4 hours PRN  chlorhexidine 4% Liquid 1 Application(s) Topical two times a day  enoxaparin Injectable 40 milliGRAM(s) SubCutaneous daily  insulin glargine Injectable (LANTUS) 20 Unit(s) SubCutaneous at bedtime  insulin lispro (HumaLOG) corrective regimen sliding scale   SubCutaneous three times a day before meals  insulin lispro Injectable (HumaLOG) 4 Unit(s) SubCutaneous three times a day before meals  oseltamivir 75 milliGRAM(s) Oral two times a day  pantoprazole   Suspension 40 milliGRAM(s) Oral before breakfast  potassium acid phosphate/sodium acid phosphate tablet (K-PHOS No. 2) 1 Tablet(s) Oral every 6 hours  sodium chloride 0.9%. 1000 milliLiter(s) IV Continuous <Continuous>    Drug Dosing Weight  Height (cm): 187.96 (31 Dec 2018 05:45)  Weight (kg): 98.4 (31 Dec 2018 05:45)  BMI (kg/m2): 27.9 (31 Dec 2018 05:45)  BSA (m2): 2.25 (31 Dec 2018 05:45)    CENTRAL LINE: [ ] YES [ x] NO      UGALDE: [ ] YES [x] NO      A-LINE:  [ ] YES [x ] NO      PMH -reviewed admission note, no change since admission     ICU Vital Signs Last 24 Hrs  T(C): 36.6 (2019 05:00), Max: 36.8 (31 Dec 2018 10:43)  T(F): 97.9 (2019 05:00), Max: 98.2 (31 Dec 2018 10:43)  HR: 108 (2019 08:00) (84 - 111)  BP: 111/81 (2019 08:00) (73/55 - 140/89)  BP(mean): 89 (2019 08:00) (60 - 100)  RR: 18 (2019 08:00) (13 - 25)  SpO2: 98% (2019 08:00) (83% - 100%)      ABG - ( 31 Dec 2018 08:22 )  pH, Arterial: 7.33  pH, Blood: x     /  pCO2: 23    /  pO2: 116   / HCO3: 12    / Base Excess: -12.2 /  SaO2: 98           @ 07:01  -  01 @ 07:00  --------------------------------------------------------  IN: 3733 mL / OUT: 2600 mL / NET: 1133 mL      PHYSICAL EXAM:    GENERAL: NAD  HEAD: atraumatic, normocephalic   EYES: PERRLA, white sclera.   ENMT: oral cavity moist  NECK: supple, no JVD  LYMPH: no palpable lymph nodes     SKIN: warm, dry   CHEST/LUNG:  No Chest deformity , no chest tenderness. bilateral breath sounds, no  adventitious sounds  HEART: RRR, no m/r/g   ABDOMEN: soft, nontender, nondistended; bowel sounds.  : ugalde catheter.  EXTREMITIES: no edema, no cyanosis, no clubbing.  NEURO: AA0X3, no focal neuro deficits        LABS:  CBC Full  -  ( 2019 04:20 )  WBC Count : 9.0 K/uL  Hemoglobin : 14.6 g/dL  Hematocrit : 45.7 %  Platelet Count - Automated : 306 K/uL  Mean Cell Volume : 87.5 fl  Mean Cell Hemoglobin : 27.9 pg  Mean Cell Hemoglobin Concentration : 31.8 gm/dL  Auto Neutrophil # : 5.3 K/uL  Auto Lymphocyte # : 2.0 K/uL  Auto Monocyte # : 1.5 K/uL  Auto Eosinophil # : 0.0 K/uL  Auto Basophil # : 0.2 K/uL  Auto Neutrophil % : 58.6 %  Auto Lymphocyte % : 22.4 %  Auto Monocyte % : 17.0 %  Auto Eosinophil % : 0.0 %  Auto Basophil % : 2.0 %        127<L>  |  96  |  11  ----------------------------<  198<H>  4.3   |  17<L>  |  0.97    Ca    8.2<L>      2019 04:20  Phos  2.7       Mg     1.9         TPro  7.2  /  Alb  2.8<L>  /  TBili  0.7  /  DBili  x   /  AST  50<H>  /  ALT  39  /  AlkPhos  79        Urinalysis Basic - ( 31 Dec 2018 11:44 )    Color: Yellow / Appearance: Clear / S.025 / pH: x  Gluc: x / Ketone: Large  / Bili: Negative / Urobili: Negative   Blood: x / Protein: 15 / Nitrite: Negative   Leuk Esterase: Negative / RBC: 0-2 /HPF / WBC 0-2 /HPF   Sq Epi: x / Non Sq Epi: Occasional /HPF / Bacteria: x        CRITICAL CARE TIME SPENT: 35 minutes    Assessment and Plan:   · Assessment		  34 y/o M from home with PMH of DM (not on any meds currently, was on Insulin and metformin  in past), GERD came with c/o sore throat, productive cough, generalized weakness and malaise for 4 days. Labs significant for hyponatremia of 120. Serum glucose 352. AG of 20. ABG on room air: 7.33/23/116. Admitted to ICU for hyponatremia and DKA, both improved     Problem/Plan - 1:  ·  Problem: Hyponatremia.  Plan: - Likely secondary to hypertonic hyponatremia secondary to hyperglycemia.   - Na on admission 120, corrected sodium for serum glucose of 352 would be 124.  - s/p 2 L NS bolus, most recent Na is 127   - TSH wnl, lipid profile mildly elevated triglycerides, will need repeat lipid profile  - Monitor BMP q4-q6h.      Problem/Plan - 2:  ·  Problem: DKA (diabetic ketoacidoses).  Plan: - Serum glucose 352 with AG of 20 and small serum acetone, ketonuria and glycosuria on admission  - pH of 7.33 on ABG on room air.   - anion gap closed s/p insulin drip, bridged to lantus 20 units at bedtime and humalog 4 units TID along with moderate sliding scale  -HbA1c 11.8.      Problem/Plan - 3:  ·  Problem: Flu.  Plan: - patient has sore throat for 4 days, was found to have flu at urgent care 2 days back and took 2 days of Tamiflu.   - Continue Tamiflu - last dose   - Cepacol for sore throat  - supportive care.      Problem/Plan - 4:  ·  Problem: GERD (gastroesophageal reflux disease).  Plan: - started on Protonix.      Problem/Plan - 5:  ·  Problem: HLD (hyperlipidemia).  Plan: lipitor started.      Problem/Plan - 6:  Problem: Prophylactic measure. Plan: IMPROVE VTE Individual Risk Assessment          RISK                                                          Points  [  ] Previous VTE                                                3  [  ] Thrombophilia                                             2  [  ] Lower limb paralysis                                   2        (unable to hold up >15 seconds)    [  ] Current Cancer                                             2         (within 6 months)  [ x ] Immobilization > 24 hrs                              1  [ x ] ICU/CCU stay > 24 hours                             1  [  ] Age > 60                                                         1    IMPROVE VTE Score: 2  Lovenox for DVT ppx  Protonix for GI ppx.      case discussed on rounds with icu team
Narrative CHEST X-RAY, 2 views 1/5/2017    History: Fever/chills/sepsis. Technique: PA and lateral views of the chest.     Comparison: Chest x-ray 12/30/2015    Findings: The cardiac silhouette is normal in respect to size. The lungs are expanded  without evidence for pneumothorax. No consolidation, or evidence of pleural  effusion is seen. The bony thorax demonstrates no acute changes. The upper abdomen is  unremarkable in appearance. Impression IMPRESSION:   1. No acute cardiopulmonary process evident by plain film imaging. Active problems  Active Hospital Problems    Diagnosis Date Noted    Atherosclerosis of native artery of right lower extremity with gangrene ([facility] [address] 01/17/2017    Sepsis due to cellulitis ([facility] [address] 01/09/2017    Acute renal failure with tubular necrosis ([facility] [address] 01/06/2017    Type 2 diabetes mellitus with right diabetic foot infection ([facility] [address] 01/05/2017    Kidney disease, chronic, stage III (moderate, EGFR 30-59 ml/min) 08/11/2016    Chronic systolic congestive heart failure ([facility] [address] 12/30/2015     EF 35-40% on 2015 Echo      Type 2 diabetes mellitus with hyperglycemia ([facility] [address] 07/29/2012    HTN (hypertension) 07/29/2012       ASSESSMENT  AND PLAN      · Diabetic foot infection  With osteomyelitis- will continue antibiotics rocephin and flagyl EOT 2/22 wound grew beta strep, staph ludgenesis. Wound vac in 2 days, possible hyperbaric treatments  · DM - will start on prandial insulin. Will continue to monitor fingersticks  · Acute on chronic renal failure - relatively stable had slight increase today.  Has been diuresed  · Anemia of CKD -  Still above 8 will continue to monitor, transfuse if CBC drops less than 8  · Demand ischemia - currently stable last echo 2015 would repeat  ·     DVT Prophylaxis: none as anemia, and Severe PAD   dispo - for rehab vs [facility],     Signed By: Sae Ross MD     January 23, 2017
PGY 1 Note discussed with supervising resident and primary attending    Patient is a 33y old  Male who presents with a chief complaint of hyponatremia (2019 14:04)      INTERVAL HPI/OVERNIGHT EVENTS: No acute events reported overnight.    Today pt presents in no acute distress.  Pt found resting comfortably in bed.  Pt reports good appetite.     MEDICATIONS  (STANDING):  atorvastatin 40 milliGRAM(s) Oral at bedtime  chlorhexidine 4% Liquid 1 Application(s) Topical two times a day  enoxaparin Injectable 40 milliGRAM(s) SubCutaneous daily  insulin glargine Injectable (LANTUS) 25 Unit(s) SubCutaneous at bedtime  insulin lispro (HumaLOG) corrective regimen sliding scale   SubCutaneous three times a day before meals  insulin lispro Injectable (HumaLOG) 6 Unit(s) SubCutaneous three times a day before meals  oseltamivir 75 milliGRAM(s) Oral two times a day  pantoprazole    Tablet 40 milliGRAM(s) Oral before breakfast  sodium chloride 0.9%. 1000 milliLiter(s) (100 mL/Hr) IV Continuous <Continuous>    MEDICATIONS  (PRN):  acetaminophen   Tablet .. 650 milliGRAM(s) Oral every 6 hours PRN Temp greater or equal to 38C (100.4F), Mild Pain (1 - 3)  benzocaine 15 mG/menthol 3.6 mG (Sugar-Free) Lozenge 1 Lozenge Oral every 4 hours PRN Sore Throat      __________________________________________________  REVIEW OF SYSTEMS:    CONSTITUTIONAL: No fever,   EYES: no acute visual disturbances  NECK: No pain or stiffness  RESPIRATORY: No cough; No shortness of breath  CARDIOVASCULAR: No chest pain, no palpitations  GASTROINTESTINAL: No pain. No nausea or vomiting; No diarrhea   NEUROLOGICAL: No headache or numbness, no tremors  MUSCULOSKELETAL: No joint pain, no muscle pain    Vital Signs Last 24 Hrs  T(C): 37 (2019 07:56), Max: 38.1 (2019 16:23)  T(F): 98.6 (2019 07:56), Max: 100.5 (2019 16:23)  HR: 105 (2019 07:56) (101 - 114)  BP: 135/83 (2019 07:56) (122/79 - 162/98)  BP(mean): 89 (2019 22:00) (81 - 109)  RR: 18 (2019 07:56) (11 - 29)  SpO2: 99% (2019 07:56) (96% - 100%)    ________________________________________________  PHYSICAL EXAM:  GENERAL: NAD  HEENT: Normocephalic;  conjunctivae and sclerae clear; moist mucous membranes;   NECK : supple  CHEST/LUNG: Clear to auscultation bilaterally with good air entry   HEART: S1 S2  regular; no murmurs, gallops or rubs  ABDOMEN: Soft, Nontender, Nondistended; Bowel sounds present  EXTREMITIES: no cyanosis; no edema; no calf tenderness  NERVOUS SYSTEM:  Awake and alert; Oriented  to place, person and time ; no new deficits    _________________________________________________  LABS:                   Urinalysis Basic - ( 31 Dec 2018 11:44 )    Color: Yellow / Appearance: Clear / S.025 / pH: x  Gluc: x / Ketone: Large  / Bili: Negative / Urobili: Negative   Blood: x / Protein: 15 / Nitrite: Negative   Leuk Esterase: Negative / RBC: 0-2 /HPF / WBC 0-2 /HPF   Sq Epi: x / Non Sq Epi: Occasional /HPF / Bacteria: x      CAPILLARY BLOOD GLUCOSE      POCT Blood Glucose.: 281 mg/dL (2019 11:30)  POCT Blood Glucose.: 253 mg/dL (2019 08:16)  POCT Blood Glucose.: 256 mg/dL (2019 15:56)        RADIOLOGY & ADDITIONAL TESTS:    Imaging Personally Reviewed:  YES    Consultant(s) Notes Reviewed:   YES    Care Discussed with Consultants :     Plan of care was discussed with patient and /or primary care giver; all questions and concerns were addressed and care was aligned with patient's wishes.

## 2019-01-02 NOTE — PROGRESS NOTE ADULT - PROBLEM SELECTOR PLAN 3
- patient has sore throat for 4 days, was found to have flu at urgent care 2 days back and took 2 days of Tamiflu.   - Continue Tamiflu - last dose 01/02  - Cepacol for sore throat  - supportive care
- patient has sore throat for 4 days, was found to have flu at urgent care 2 days back and took 2 days of Tamiflu.   -Low grade fever yesterday- will continue to monitor   - Continue Tamiflu - last dose 01/02  - Cepacol for sore throat  - supportive care

## 2019-01-02 NOTE — PROGRESS NOTE ADULT - PROBLEM SELECTOR PLAN 1
- Likely secondary to hypertonic hyponatremia secondary to hyperglycemia.   - Na on admission 120, corrected sodium for serum glucose of 352 would be 124.  - s/p 2 L NS bolus, most recent Na is 127   - TSH wnl, lipid profile mildly elevated triglycerides, will need repeat lipid profile  - Monitor BMP q4-q6h
- Likely secondary to hypertonic hyponatremia secondary to hyperglycemia.   - Na on admission 120, currently corrected Na 132  - Monitor BMP q24 for now

## 2019-01-02 NOTE — PROGRESS NOTE ADULT - PROBLEM SELECTOR PLAN 2
- Serum glucose 352 with AG of 20 and small serum acetone, ketonuria and glycosuria on admission  - pH of 7.33 on ABG on room air.   - anion gap closed s/p insulin drip, bridged to lantus 20 units at bedtime and humalog 4 units TID along with moderate sliding scale  -HbA1c 11.8
- anion gap closed- bridged to lantus 20 units at bedtime and humalog 4 units TID along with moderate sliding scale  - FS remains elevated this AM- will inc Lantus to 25u, and Humalog to 6 units   - HbA1c 11.8  -Endo: Dr. Pulido, consult pending

## 2019-01-02 NOTE — PROGRESS NOTE ADULT - PROBLEM SELECTOR PLAN 6
IMPROVE VTE Individual Risk Assessment          RISK                                                          Points  [  ] Previous VTE                                                3  [  ] Thrombophilia                                             2  [  ] Lower limb paralysis                                   2        (unable to hold up >15 seconds)    [  ] Current Cancer                                             2         (within 6 months)  [ x ] Immobilization > 24 hrs                              1  [ x ] ICU/CCU stay > 24 hours                             1  [  ] Age > 60                                                         1    IMPROVE VTE Score: 2  Lovenox for DVT ppx  Protonix for GI ppx.
IMPROVE VTE Individual Risk Assessment          RISK                                                          Points  [  ] Previous VTE                                                3  [  ] Thrombophilia                                             2  [  ] Lower limb paralysis                                   2        (unable to hold up >15 seconds)    [  ] Current Cancer                                             2         (within 6 months)  [ x ] Immobilization > 24 hrs                              1  [ x ] ICU/CCU stay > 24 hours                             1  [  ] Age > 60                                                         1    IMPROVE VTE Score: 2  Lovenox for DVT ppx  Protonix for GI ppx.

## 2019-01-03 ENCOUNTER — TRANSCRIPTION ENCOUNTER (OUTPATIENT)
Age: 34
End: 2019-01-03

## 2019-01-03 VITALS — WEIGHT: 189.82 LBS

## 2019-01-03 LAB
ANION GAP SERPL CALC-SCNC: 12 MMOL/L — SIGNIFICANT CHANGE UP (ref 5–17)
BUN SERPL-MCNC: 5 MG/DL — LOW (ref 7–18)
CALCIUM SERPL-MCNC: 8.9 MG/DL — SIGNIFICANT CHANGE UP (ref 8.4–10.5)
CHLORIDE SERPL-SCNC: 92 MMOL/L — LOW (ref 96–108)
CO2 SERPL-SCNC: 28 MMOL/L — SIGNIFICANT CHANGE UP (ref 22–31)
CREAT SERPL-MCNC: 0.8 MG/DL — SIGNIFICANT CHANGE UP (ref 0.5–1.3)
GLUCOSE BLDC GLUCOMTR-MCNC: 241 MG/DL — HIGH (ref 70–99)
GLUCOSE BLDC GLUCOMTR-MCNC: 276 MG/DL — HIGH (ref 70–99)
GLUCOSE BLDC GLUCOMTR-MCNC: 336 MG/DL — HIGH (ref 70–99)
GLUCOSE SERPL-MCNC: 253 MG/DL — HIGH (ref 70–99)
HCT VFR BLD CALC: 44.5 % — SIGNIFICANT CHANGE UP (ref 39–50)
HGB BLD-MCNC: 14.1 G/DL — SIGNIFICANT CHANGE UP (ref 13–17)
MAGNESIUM SERPL-MCNC: 2.1 MG/DL — SIGNIFICANT CHANGE UP (ref 1.6–2.6)
MCHC RBC-ENTMCNC: 27.9 PG — SIGNIFICANT CHANGE UP (ref 27–34)
MCHC RBC-ENTMCNC: 31.7 GM/DL — LOW (ref 32–36)
MCV RBC AUTO: 88.1 FL — SIGNIFICANT CHANGE UP (ref 80–100)
PHOSPHATE SERPL-MCNC: 2.4 MG/DL — LOW (ref 2.5–4.5)
PLATELET # BLD AUTO: 298 K/UL — SIGNIFICANT CHANGE UP (ref 150–400)
POTASSIUM SERPL-MCNC: 3.4 MMOL/L — LOW (ref 3.5–5.3)
POTASSIUM SERPL-SCNC: 3.4 MMOL/L — LOW (ref 3.5–5.3)
RBC # BLD: 5.06 M/UL — SIGNIFICANT CHANGE UP (ref 4.2–5.8)
RBC # FLD: 11.9 % — SIGNIFICANT CHANGE UP (ref 10.3–14.5)
SODIUM SERPL-SCNC: 132 MMOL/L — LOW (ref 135–145)
WBC # BLD: 9.7 K/UL — SIGNIFICANT CHANGE UP (ref 3.8–10.5)
WBC # FLD AUTO: 9.7 K/UL — SIGNIFICANT CHANGE UP (ref 3.8–10.5)

## 2019-01-03 PROCEDURE — 84133 ASSAY OF URINE POTASSIUM: CPT

## 2019-01-03 PROCEDURE — 83036 HEMOGLOBIN GLYCOSYLATED A1C: CPT

## 2019-01-03 PROCEDURE — 80061 LIPID PANEL: CPT

## 2019-01-03 PROCEDURE — 84443 ASSAY THYROID STIM HORMONE: CPT

## 2019-01-03 PROCEDURE — 82550 ASSAY OF CK (CPK): CPT

## 2019-01-03 PROCEDURE — 82553 CREATINE MB FRACTION: CPT

## 2019-01-03 PROCEDURE — 84156 ASSAY OF PROTEIN URINE: CPT

## 2019-01-03 PROCEDURE — 80048 BASIC METABOLIC PNL TOTAL CA: CPT

## 2019-01-03 PROCEDURE — 82803 BLOOD GASES ANY COMBINATION: CPT

## 2019-01-03 PROCEDURE — 99285 EMERGENCY DEPT VISIT HI MDM: CPT | Mod: 25

## 2019-01-03 PROCEDURE — 83930 ASSAY OF BLOOD OSMOLALITY: CPT

## 2019-01-03 PROCEDURE — 83735 ASSAY OF MAGNESIUM: CPT

## 2019-01-03 PROCEDURE — 82010 KETONE BODYS QUAN: CPT

## 2019-01-03 PROCEDURE — 82009 KETONE BODYS QUAL: CPT

## 2019-01-03 PROCEDURE — 80307 DRUG TEST PRSMV CHEM ANLYZR: CPT

## 2019-01-03 PROCEDURE — 85027 COMPLETE CBC AUTOMATED: CPT

## 2019-01-03 PROCEDURE — 82570 ASSAY OF URINE CREATININE: CPT

## 2019-01-03 PROCEDURE — 93880 EXTRACRANIAL BILAT STUDY: CPT

## 2019-01-03 PROCEDURE — 84560 ASSAY OF URINE/URIC ACID: CPT

## 2019-01-03 PROCEDURE — 71045 X-RAY EXAM CHEST 1 VIEW: CPT

## 2019-01-03 PROCEDURE — 82436 ASSAY OF URINE CHLORIDE: CPT

## 2019-01-03 PROCEDURE — 84484 ASSAY OF TROPONIN QUANT: CPT

## 2019-01-03 PROCEDURE — 87086 URINE CULTURE/COLONY COUNT: CPT

## 2019-01-03 PROCEDURE — 84300 ASSAY OF URINE SODIUM: CPT

## 2019-01-03 PROCEDURE — 96372 THER/PROPH/DIAG INJ SC/IM: CPT

## 2019-01-03 PROCEDURE — 93306 TTE W/DOPPLER COMPLETE: CPT

## 2019-01-03 PROCEDURE — 36415 COLL VENOUS BLD VENIPUNCTURE: CPT

## 2019-01-03 PROCEDURE — 81001 URINALYSIS AUTO W/SCOPE: CPT

## 2019-01-03 PROCEDURE — 83935 ASSAY OF URINE OSMOLALITY: CPT

## 2019-01-03 PROCEDURE — 80053 COMPREHEN METABOLIC PANEL: CPT

## 2019-01-03 PROCEDURE — 82962 GLUCOSE BLOOD TEST: CPT

## 2019-01-03 PROCEDURE — 84100 ASSAY OF PHOSPHORUS: CPT

## 2019-01-03 RX ORDER — INSULIN ASPART 100 [IU]/ML
8 INJECTION, SOLUTION SUBCUTANEOUS
Qty: 1 | Refills: 0 | OUTPATIENT
Start: 2019-01-03 | End: 2019-02-01

## 2019-01-03 RX ORDER — ATORVASTATIN CALCIUM 80 MG/1
1 TABLET, FILM COATED ORAL
Qty: 0 | Refills: 0 | COMMUNITY
Start: 2019-01-03

## 2019-01-03 RX ORDER — POTASSIUM CHLORIDE 20 MEQ
40 PACKET (EA) ORAL ONCE
Qty: 0 | Refills: 0 | Status: COMPLETED | OUTPATIENT
Start: 2019-01-03 | End: 2019-01-03

## 2019-01-03 RX ORDER — ENOXAPARIN SODIUM 100 MG/ML
30 INJECTION SUBCUTANEOUS
Qty: 1 | Refills: 0 | OUTPATIENT
Start: 2019-01-03

## 2019-01-03 RX ORDER — POTASSIUM CHLORIDE 20 MEQ
40 PACKET (EA) ORAL EVERY 4 HOURS
Qty: 0 | Refills: 0 | Status: DISCONTINUED | OUTPATIENT
Start: 2019-01-03 | End: 2019-01-03

## 2019-01-03 RX ORDER — ENOXAPARIN SODIUM 100 MG/ML
35 INJECTION SUBCUTANEOUS
Qty: 3 | Refills: 0 | OUTPATIENT
Start: 2019-01-03 | End: 2019-02-01

## 2019-01-03 RX ORDER — INSULIN LISPRO 100/ML
10 VIAL (ML) SUBCUTANEOUS
Qty: 0 | Refills: 0 | Status: DISCONTINUED | OUTPATIENT
Start: 2019-01-03 | End: 2019-01-03

## 2019-01-03 RX ORDER — INSULIN GLARGINE 100 [IU]/ML
35 INJECTION, SOLUTION SUBCUTANEOUS AT BEDTIME
Qty: 0 | Refills: 0 | Status: DISCONTINUED | OUTPATIENT
Start: 2019-01-03 | End: 2019-01-03

## 2019-01-03 RX ORDER — INSULIN LISPRO 100/ML
4 VIAL (ML) SUBCUTANEOUS ONCE
Qty: 0 | Refills: 0 | Status: COMPLETED | OUTPATIENT
Start: 2019-01-03 | End: 2019-01-03

## 2019-01-03 RX ORDER — POTASSIUM PHOSPHATE, MONOBASIC POTASSIUM PHOSPHATE, DIBASIC 236; 224 MG/ML; MG/ML
15 INJECTION, SOLUTION INTRAVENOUS ONCE
Qty: 0 | Refills: 0 | Status: COMPLETED | OUTPATIENT
Start: 2019-01-03 | End: 2019-01-03

## 2019-01-03 RX ORDER — ATORVASTATIN CALCIUM 80 MG/1
1 TABLET, FILM COATED ORAL
Qty: 30 | Refills: 0 | OUTPATIENT
Start: 2019-01-03 | End: 2019-02-01

## 2019-01-03 RX ADMIN — Medication 40 MILLIEQUIVALENT(S): at 06:47

## 2019-01-03 RX ADMIN — Medication 6 UNIT(S): at 08:57

## 2019-01-03 RX ADMIN — Medication 40 MILLIEQUIVALENT(S): at 12:42

## 2019-01-03 RX ADMIN — POTASSIUM PHOSPHATE, MONOBASIC POTASSIUM PHOSPHATE, DIBASIC 62.5 MILLIMOLE(S): 236; 224 INJECTION, SOLUTION INTRAVENOUS at 09:34

## 2019-01-03 RX ADMIN — Medication 75 MILLIGRAM(S): at 07:02

## 2019-01-03 RX ADMIN — Medication 4: at 12:41

## 2019-01-03 RX ADMIN — Medication 6: at 08:57

## 2019-01-03 RX ADMIN — Medication 4 UNIT(S): at 12:42

## 2019-01-03 RX ADMIN — Medication 30 MILLILITER(S): at 02:30

## 2019-01-03 RX ADMIN — ENOXAPARIN SODIUM 40 MILLIGRAM(S): 100 INJECTION SUBCUTANEOUS at 12:42

## 2019-01-03 NOTE — DISCHARGE NOTE ADULT - CARE PLAN
Principal Discharge DX:	DKA (diabetic ketoacidoses)  Goal:	Please continue current insulin and low carb diet regimen, and follow up with your PCP within 1 week  Assessment and plan of treatment:	You presented with diabetic ketoacidosis.  You were also flu positive.  You were not on an insulin regimen on presentation. You required an ICU stay, and were sent to our general medicine floor for further treatment.  You were continued on pre-meal, and nighttime insulin.  Please continue Novalog 8 units pre meals, and lantus 30units at bedtime.  Please monitor your blood glucose before each meal and at bedtime.  Please adhere to low carb, low sugar diet.  Please follow up with your PCP in 1 week.  Secondary Diagnosis:	Hyponatremia  Goal:	Please check renal function in 1 week with PCP  Assessment and plan of treatment:	You presented with low sodium due to your hyperglycemic status.  Your sodium level improved with fluids and insulin.  Please have your renal function checked in 1 week with your PCP.  Secondary Diagnosis:	HLD (hyperlipidemia)  Goal:	Please continue statin therapy and a low cholesterol diet  Secondary Diagnosis:	Flu  Goal:	Please continue supportive care with fluids  Assessment and plan of treatment:	You presented flu positive and were on tamiflu therapy.  You completed your course during admission.  Please follow up with your PCP in 1 week for follow up. Principal Discharge DX:	DKA (diabetic ketoacidoses)  Goal:	Please continue current insulin and low carb diet regimen, and follow up with your PCP within 1 week  Assessment and plan of treatment:	You presented with diabetic ketoacidosis.  You were also flu positive.  You were not on an insulin regimen on presentation. You required an ICU stay, and were sent to our general medicine floor for further treatment.  You were continued on pre-meal, and nighttime insulin.  Please continue Novalog 8 units pre meals, and lantus 35 units at bedtime.  Please monitor your blood glucose before each meal and at bedtime.  Please adhere to low carb, low sugar diet.  Please follow up with your PCP at [facility] tomorrow.  Please schedule annual opthalmology and podiatry appointments for preventative diabetic care.  Secondary Diagnosis:	Hyponatremia  Goal:	Please check renal function in 1 week with PCP  Assessment and plan of treatment:	You presented with low sodium due to your hyperglycemic status.  Your sodium level improved with fluids and insulin.  Please have your renal function checked in 1 week with your PCP.  Secondary Diagnosis:	HLD (hyperlipidemia)  Goal:	Please continue statin therapy and a low cholesterol diet  Secondary Diagnosis:	Flu  Goal:	Please continue supportive care with fluids  Assessment and plan of treatment:	You presented flu positive and were on tamiflu therapy.  You completed your course during admission.  Please follow up with your PCP in 1 week for follow up.

## 2019-01-03 NOTE — DIETITIAN INITIAL EVALUATION ADULT. - PERTINENT LABORATORY DATA
01-03 Na132 mmol/L<L> Glu 253 mg/dL<H> K+ 3.4 mmol/L<L> Cr  0.80 mg/dL BUN 5 mg/dL<L> 01-03 Phos 2.4 mg/dL<L> 01-01 Alb 2.8 g/dL<L> 12-31 [id number] 11.8 %<H> 12-31 Chol 205 mg/dL<H> LDL 79 mg/dL HDL 59 mg/dL Trig 336 mg/dL<H>

## 2019-01-03 NOTE — DIETITIAN INITIAL EVALUATION ADULT. - PROBLEM SELECTOR PLAN 1
- Likely secondary to hypovolemic hyponatremia or hypertonic hyponatremia secondary to hyperglycemia.   - Na on admission 120, corrected sodium for serum glucose of 352 would be 124.  - s/p 2 L NS bolus, repeat serum sodium is 124 and corrected 127.   - Will start on 1/2 NS at 100 cc/hr.  - Check urine osmolality and urine lytes.   - Check serum cortisol, TSH and Lipid panel  - Monitor BMP q4-q6h  - Goal rate of sodium correction in first 24 hrs is 8 mEq/L.

## 2019-01-03 NOTE — CONSULT NOTE ADULT - PROBLEM SELECTOR RECOMMENDATION 9
s/p mild DKA  needs betetr controlled  change lantus to 35 and humalog to 10 ac tid  fsg ac and hs  d/w hs  f/u at [facility]  compliance d/w pt and family at length

## 2019-01-03 NOTE — DISCHARGE NOTE ADULT - PLAN OF CARE
Please continue current insulin and low carb diet regimen, and follow up with your PCP within 1 week You presented with diabetic ketoacidosis.  You were also flu positive.  You were not on an insulin regimen on presentation. You required an ICU stay, and were sent to our general medicine floor for further treatment.  You were continued on pre-meal, and nighttime insulin.  Please continue Novalog 8 units pre meals, and lantus 30units at bedtime.  Please monitor your blood glucose before each meal and at bedtime.  Please adhere to low carb, low sugar diet.  Please follow up with your PCP in 1 week. Please check renal function in 1 week with PCP You presented with low sodium due to your hyperglycemic status.  Your sodium level improved with fluids and insulin.  Please have your renal function checked in 1 week with your PCP. Please continue statin therapy and a low cholesterol diet Please continue supportive care with fluids You presented flu positive and were on tamiflu therapy.  You completed your course during admission.  Please follow up with your PCP in 1 week for follow up. You presented with diabetic ketoacidosis.  You were also flu positive.  You were not on an insulin regimen on presentation. You required an ICU stay, and were sent to our general medicine floor for further treatment.  You were continued on pre-meal, and nighttime insulin.  Please continue Novalog 8 units pre meals, and lantus 35 units at bedtime.  Please monitor your blood glucose before each meal and at bedtime.  Please adhere to low carb, low sugar diet.  Please follow up with your PCP at [facility] tomorrow.  Please schedule annual opthalmology and podiatry appointments for preventative diabetic care.

## 2019-01-03 NOTE — DISCHARGE NOTE ADULT - PATIENT PORTAL LINK FT
You can access the [facility] Patient Portal, offered by [facility], by registering with the following website: [email or web address]

## 2019-01-03 NOTE — DISCHARGE NOTE ADULT - MEDICATION SUMMARY - MEDICATIONS TO TAKE
I will START or STAY ON the medications listed below when I get home from the hospital:    Lancarmen Solostar Pen 100 units/mL subcutaneous solution  -- 30 unit(s) subcutaneous once a day (at bedtime)   -- Do not drink alcoholic beverages when taking this medication.  It is very important that you take or use this exactly as directed.  Do not skip doses or discontinue unless directed by your doctor.  Keep in refrigerator.  Do not freeze.    -- Indication: For Diabetes    NovoLOG FlexPen 100 units/mL injectable solution  -- 8 unit(s) injectable 3 times a day   -- Check with your doctor before becoming pregnant.  Do not drink alcoholic beverages when taking this medication.  Keep in refrigerator.  Do not freeze.  Obtain medical advice before taking any non-prescription drugs as some may affect the action of this medication.    -- Indication: For Diabetes    atorvastatin 40 mg oral tablet  -- 1 tab(s) by mouth once a day (at bedtime)  -- Indication: For HLD (hyperlipidemia) I will START or STAY ON the medications listed below when I get home from the hospital:    Lancarmen Solostar Pen 100 units/mL subcutaneous solution  -- 35 unit(s) subcutaneous once a day (at bedtime)   -- Do not drink alcoholic beverages when taking this medication.  It is very important that you take or use this exactly as directed.  Do not skip doses or discontinue unless directed by your doctor.  Keep in refrigerator.  Do not freeze.    -- Indication: For Diabetes    NovoLOG FlexPen 100 units/mL injectable solution  -- 8 unit(s) injectable 3 times a day   -- Check with your doctor before becoming pregnant.  Do not drink alcoholic beverages when taking this medication.  Keep in refrigerator.  Do not freeze.  Obtain medical advice before taking any non-prescription drugs as some may affect the action of this medication.    -- Indication: For Diabetes    atorvastatin 40 mg oral tablet  -- 1 tab(s) by mouth once a day (at bedtime)  -- Indication: For HLD (hyperlipidemia)

## 2019-01-03 NOTE — DIETITIAN INITIAL EVALUATION ADULT. - FACTORS AFF FOOD INTAKE
weakness, DKA, flu, di/pain/persistent nausea/vomiting/other (specify) weakness, DKA, flu, diabetes, GERD, HLD/vomiting/other (specify)/pain/persistent nausea

## 2019-01-03 NOTE — CONSULT NOTE ADULT - SUBJECTIVE AND OBJECTIVE BOX
Patient is a 33y old  Male who presents with a chief complaint of hyponatremia (03 Jan 2019 09:31)      HPI:  32 y/o M from home with PMH of DM (not on any meds currently, was on Insulin and metformin  in past), GERD came with c/o sore throat, productive cough, generalized weakness and malaise for 4 days. Reported kids were sick at home last week, went to urgent care 2 days ago and was given Tamiflu which he took for 2 days. Patient states that he is not able to eat for past 2 days , vomited x 1 yesterday and have been drinking a lot of water to keep himself hydrated. Denies any  chest pain, dyspnea, abdominal pain, nausea, vomiting, urinary complaints, dizziness, headache or any other complaints. Uses cannabis frequently. Denies tobacco or alcohol use.      ED course:  Vitals stable. Labs significant for hyponatremia of 120. Serum glucose 352. AG of 20. Small serum acetone.  ABG on room air: 7.33/23/116. Received 2 L NS bolus. ICU consulted for hyponatremia and concern for DKA. (31 Dec 2018 11:44)  s/p iv insulin for mild dka    PAST MEDICAL & SURGICAL HISTORY:  GERD (gastroesophageal reflux disease)  Diabetes  No significant past surgical history         MEDICATIONS  (STANDING):  atorvastatin 40 milliGRAM(s) Oral at bedtime  chlorhexidine 4% Liquid 1 Application(s) Topical two times a day  enoxaparin Injectable 40 milliGRAM(s) SubCutaneous daily  insulin glargine Injectable (LANTUS) 35 Unit(s) SubCutaneous at bedtime  insulin lispro (HumaLOG) corrective regimen sliding scale   SubCutaneous three times a day before meals  insulin lispro Injectable (HumaLOG) 10 Unit(s) SubCutaneous three times a day before meals  pantoprazole    Tablet 40 milliGRAM(s) Oral before breakfast  sodium chloride 0.9%. 1000 milliLiter(s) (125 mL/Hr) IV Continuous <Continuous>    MEDICATIONS  (PRN):  acetaminophen   Tablet .. 650 milliGRAM(s) Oral every 6 hours PRN Temp greater or equal to 38C (100.4F), Mild Pain (1 - 3)  aluminum hydroxide/magnesium hydroxide/simethicone Suspension 30 milliLiter(s) Oral every 6 hours PRN Dyspepsia  benzocaine 15 mG/menthol 3.6 mG (Sugar-Free) Lozenge 1 Lozenge Oral every 4 hours PRN Sore Throat      FAMILY HISTORY:  Family history of diabetes mellitus (Father)      SOCIAL HISTORY:      REVIEW OF SYSTEMS:  CONSTITUTIONAL: No fever, weight loss, or fatigue  EYES: No eye pain, visual disturbances, or discharge  ENT:  No difficulty hearing, tinnitus, vertigo; No sinus or throat pain  NECK: No pain or stiffness  RESPIRATORY: No cough, wheezing, chills or hemoptysis; No Shortness of Breath  CARDIOVASCULAR: No chest pain, palpitations, passing out, dizziness, or leg swelling  GASTROINTESTINAL: No abdominal or epigastric pain. No nausea, vomiting, or hematemesis; No diarrhea or constipation. No melena or hematochezia.  GENITOURINARY: No dysuria, frequency, hematuria, or incontinence  NEUROLOGICAL: No headaches, memory loss, loss of strength, numbness, or tremors  SKIN: No itching, burning, rashes, or lesions   LYMPH Nodes: No enlarged glands  ENDOCRINE: No heat or cold intolerance; No hair loss  MUSCULOSKELETAL: No joint pain or swelling; No muscle, back, or extremity pain  PSYCHIATRIC: No depression, anxiety, mood swings, or difficulty sleeping  HEME/LYMPH: No easy bruising, or bleeding gums  ALLERGY AND IMMUNOLOGIC: No hives or eczema	        Vital Signs Last 24 Hrs  T(C): 36.3 (03 Jan 2019 08:04), Max: 37.4 (03 Jan 2019 00:04)  T(F): 97.4 (03 Jan 2019 08:04), Max: 99.3 (03 Jan 2019 00:04)  HR: 102 (03 Jan 2019 08:04) (102 - 110)  BP: 143/98 (03 Jan 2019 08:04) (143/98 - 157/91)  BP(mean): --  RR: 18 (03 Jan 2019 08:04) (18 - 18)  SpO2: 99% (03 Jan 2019 08:04) (99% - 100%)      Constitutional:    HEENT: nad    Neck:  No JVD, bruits or thyromegaly    Respiratory:  Clear without rales or rhonchi    Cardiovascular:  RR without murmur, rub or gallop.    Gastrointestinal: Soft without hepatosplenomegaly.    Extremities: without cyanosis, clubbing or edema.    Neurological:  Oriented   x  3  . No gross sensory or motor defects.        LABS:                        14.1   9.7   )-----------( 298      ( 03 Jan 2019 06:39 )             44.5     01-03    132<L>  |  92<L>  |  5<L>  ----------------------------<  253<H>  3.4<L>   |  28  |  0.80    Ca    8.9      03 Jan 2019 06:39  Phos  2.4     01-03  Mg     2.1     01-03              CAPILLARY BLOOD GLUCOSE      POCT Blood Glucose.: 241 mg/dL (03 Jan 2019 11:56)  POCT Blood Glucose.: 276 mg/dL (03 Jan 2019 08:47)  POCT Blood Glucose.: 336 mg/dL (03 Jan 2019 08:34)  POCT Blood Glucose.: 180 mg/dL (02 Jan 2019 21:10)  POCT Blood Glucose.: 232 mg/dL (02 Jan 2019 16:36)      RADIOLOGY & ADDITIONAL STUDIES:

## 2019-01-03 NOTE — CONSULT NOTE ADULT - ASSESSMENT
34 y/o M from home with PMH of DM (not on any meds currently, was on Insulin and metformin  in past), GERD came with c/o sore throat, productive cough, generalized weakness and malaise for 4 days. s/p iv insulin for mild dka

## 2019-01-03 NOTE — DIETITIAN INITIAL EVALUATION ADULT. - OTHER INFO
Patient seen for LOS. Patient from home lives with family. Visited pt. alert, wife at bedside, per pt. was not eating well 2-3days with the flu, had nausea/vomiting x 1-2 episodes therefore increased his consumption fluids PTA, stated  Lbs but may have loss wt. since getting "sick", unable to give amounts?, per pt. limits carbohydrate intake, was not on any type of meds PTA, also presently he feels overwhelmed with "medical condition" however accepted nutrition education on [employer] & reinforced information given, answer nutrition questions & concerns, in house consuming 50% & tolerating, Endo consult noted, skin intact. Discussed with MD RN

## 2019-01-03 NOTE — DISCHARGE NOTE ADULT - HOSPITAL COURSE
32 y/o M from home with PMH of DM (not on any meds currently, was on Insulin and metformin  in past), GERD came with c/o sore throat, productive cough, generalized weakness and malaise for 4 days. Reported kids were sick at home last week, went to urgent care 2 days ago and was given Tamiflu which he took for 2 days. Patient states that he is not able to eat for past 2 days , vomited x 1 yesterday and have been drinking a lot of water to keep himself hydrated. Denies any  chest pain, dyspnea, abdominal pain, nausea, vomiting, urinary complaints, dizziness, headache or any other complaints. Uses cannabis frequently. Denies tobacco or alcohol use.      ED course:  Vitals stable. Labs significant for hyponatremia of 120. Serum glucose 352. AG of 20. Small serum acetone.  ABG on room air: 7.33/23/116. Received 2 L NS bolus. ICU consulted for hyponatremia and concern for DKA.     Pt was admitted to ICU for insulin drip and closer monitoring.  Pt received aggressive IV hydration.  Anion gap closed.   Pt was started on diet.  Insulin drip was titrated down and pt was started on basal and bolus insulin.  Blood sugar remained stable.  Pt stated he is employed but does not have insurance.  Pt states he has funds for medications, and does not need VNS.      Pt is currently medically stable for discharge with instruction to follow up with her PCP in 1 week. 32 y/o M from home with PMH of DM (not on any meds currently, was on Insulin and metformin  in past), GERD came with c/o sore throat, productive cough, generalized weakness and malaise for 4 days. Reported kids were sick at home last week, went to urgent care 2 days ago and was given Tamiflu which he took for 2 days. Patient states that he is not able to eat for past 2 days , vomited x 1 yesterday and have been drinking a lot of water to keep himself hydrated. Denies any  chest pain, dyspnea, abdominal pain, nausea, vomiting, urinary complaints, dizziness, headache or any other complaints. Uses cannabis frequently. Denies tobacco or alcohol use.      ED course:  Vitals stable. Labs significant for hyponatremia of 120. Serum glucose 352. AG of 20. Small serum acetone.  ABG on room air: 7.33/23/116. Received 2 L NS bolus. ICU consulted for hyponatremia and concern for DKA.     Pt was admitted to ICU for insulin drip and closer monitoring.  Pt received aggressive IV hydration.  Anion gap closed.   Pt was started on diet.  Insulin drip was titrated down and pt was started on basal and bolus insulin.  Blood sugar remained stable.  Pt stated he is employed but does not have insurance.  Pt states he has funds for medications, and does not need VNS.      Pt is currently medically stable for discharge with instruction to follow up with her PCP in 1 week.   Pt has appt with [facility] in AM.

## 2019-01-04 LAB
CORTICOSTEROID BINDING GLOBULIN RESULT: 2.8 MG/DL — SIGNIFICANT CHANGE UP
CORTIS F/TOTAL MFR SERPL: 54 % — SIGNIFICANT CHANGE UP
CORTIS SERPL-MCNC: 41 UG/DL — HIGH
CORTISOL, FREE RESULT: 22 UG/DL — HIGH

## 2021-07-29 NOTE — PROGRESS NOTE ADULT - PROBLEM/PLAN-1
DISPLAY PLAN FREE TEXT
DISPLAY PLAN FREE TEXT
CT Angio Neck w/ IV Cont (07.27.21) old right MCA infarct is noted with areas of gliosis and encephalomalacia in the right frontal and temporal lobes. Moderate concentric narrowing proximal right internal carotid artery. Mild narrowing left proximal internal carotid artery.